# Patient Record
Sex: FEMALE | Race: WHITE | Employment: OTHER | URBAN - METROPOLITAN AREA
[De-identification: names, ages, dates, MRNs, and addresses within clinical notes are randomized per-mention and may not be internally consistent; named-entity substitution may affect disease eponyms.]

---

## 2017-01-01 ENCOUNTER — HOSPITAL ENCOUNTER (OUTPATIENT)
Dept: LAB | Age: 82
Discharge: HOME | End: 2017-08-02
Payer: MEDICARE

## 2017-01-01 ENCOUNTER — HOSPITAL ENCOUNTER (OUTPATIENT)
Dept: LAB | Age: 82
Discharge: HOME | End: 2017-03-08
Payer: MEDICARE

## 2017-01-01 ENCOUNTER — HOSPITAL ENCOUNTER (OUTPATIENT)
Dept: GENERAL RADIOLOGY | Age: 82
Discharge: HOME | End: 2017-08-02
Payer: MEDICARE

## 2017-01-01 DIAGNOSIS — J44.9 OBSTRUCTIVE CHRONIC BRONCHITIS WITHOUT EXACERBATION (HCC): ICD-10-CM

## 2017-01-01 LAB
ALBUMIN SERPL BCP-MCNC: 3.3 G/DL (ref 3.4–5)
ALBUMIN SERPL BCP-MCNC: 3.8 G/DL (ref 3.4–5)
ALBUMIN/GLOB SERPL: 0.8 {RATIO} (ref 0.8–1.7)
ALBUMIN/GLOB SERPL: 0.8 {RATIO} (ref 0.8–1.7)
ALP SERPL-CCNC: 106 U/L (ref 45–117)
ALP SERPL-CCNC: 95 U/L (ref 45–117)
ALT SERPL-CCNC: 12 U/L (ref 13–56)
ALT SERPL-CCNC: 16 U/L (ref 13–56)
ANION GAP BLD CALC-SCNC: 11 MMOL/L (ref 3–18)
ANION GAP BLD CALC-SCNC: 11 MMOL/L (ref 3–18)
APPEARANCE UR: CLEAR
AST SERPL W P-5'-P-CCNC: 18 U/L (ref 15–37)
AST SERPL W P-5'-P-CCNC: 19 U/L (ref 15–37)
BACTERIA URNS QL MICRO: ABNORMAL /HPF
BASOPHILS # BLD AUTO: 0 K/UL (ref 0–0.06)
BASOPHILS # BLD: 0 % (ref 0–2)
BILIRUB SERPL-MCNC: 0.7 MG/DL (ref 0.2–1)
BILIRUB SERPL-MCNC: 0.9 MG/DL (ref 0.2–1)
BILIRUB UR QL: NEGATIVE
BUN SERPL-MCNC: 15 MG/DL (ref 7–18)
BUN SERPL-MCNC: 8 MG/DL (ref 7–18)
BUN/CREAT SERPL: 11 (ref 12–20)
BUN/CREAT SERPL: 20 (ref 12–20)
CALCIUM SERPL-MCNC: 9.1 MG/DL (ref 8.5–10.1)
CALCIUM SERPL-MCNC: 9.3 MG/DL (ref 8.5–10.1)
CHLORIDE SERPL-SCNC: 102 MMOL/L (ref 100–108)
CHLORIDE SERPL-SCNC: 102 MMOL/L (ref 100–108)
CO2 SERPL-SCNC: 31 MMOL/L (ref 21–32)
CO2 SERPL-SCNC: 31 MMOL/L (ref 21–32)
COLOR UR: YELLOW
CREAT SERPL-MCNC: 0.74 MG/DL (ref 0.6–1.3)
CREAT SERPL-MCNC: 0.76 MG/DL (ref 0.6–1.3)
DIFFERENTIAL METHOD BLD: ABNORMAL
EOSINOPHIL # BLD: 0 K/UL (ref 0–0.4)
EOSINOPHIL NFR BLD: 0 % (ref 0–5)
EPITH CASTS URNS QL MICRO: ABNORMAL /LPF (ref 0–5)
ERYTHROCYTE [DISTWIDTH] IN BLOOD BY AUTOMATED COUNT: 13.7 % (ref 11.6–14.5)
ERYTHROCYTE [DISTWIDTH] IN BLOOD BY AUTOMATED COUNT: 14.6 % (ref 11.6–14.5)
FAX TO INFO,FAXT: NORMAL
FAX TO INFO,FAXT: NORMAL
FAX TO NUMBER,FAXN: NORMAL
FAX TO NUMBER,FAXN: NORMAL
GLOBULIN SER CALC-MCNC: 3.9 G/DL (ref 2–4)
GLOBULIN SER CALC-MCNC: 4.6 G/DL (ref 2–4)
GLUCOSE SERPL-MCNC: 113 MG/DL (ref 74–99)
GLUCOSE SERPL-MCNC: 97 MG/DL (ref 74–99)
GLUCOSE UR STRIP.AUTO-MCNC: NEGATIVE MG/DL
HCT VFR BLD AUTO: 38.5 % (ref 35–45)
HCT VFR BLD AUTO: 40.4 % (ref 35–45)
HGB BLD-MCNC: 13 G/DL (ref 12–16)
HGB BLD-MCNC: 13.1 G/DL (ref 12–16)
HGB UR QL STRIP: ABNORMAL
KETONES UR QL STRIP.AUTO: NEGATIVE MG/DL
LEUKOCYTE ESTERASE UR QL STRIP.AUTO: NEGATIVE
LYMPHOCYTES # BLD AUTO: 20 % (ref 21–52)
LYMPHOCYTES # BLD: 1.6 K/UL (ref 0.9–3.6)
MCH RBC QN AUTO: 29.1 PG (ref 24–34)
MCH RBC QN AUTO: 30.8 PG (ref 24–34)
MCHC RBC AUTO-ENTMCNC: 32.2 G/DL (ref 31–37)
MCHC RBC AUTO-ENTMCNC: 34 G/DL (ref 31–37)
MCV RBC AUTO: 90.4 FL (ref 74–97)
MCV RBC AUTO: 90.4 FL (ref 74–97)
MONOCYTES # BLD: 0.6 K/UL (ref 0.05–1.2)
MONOCYTES NFR BLD AUTO: 8 % (ref 3–10)
NEUTS SEG # BLD: 5.8 K/UL (ref 1.8–8)
NEUTS SEG NFR BLD AUTO: 72 % (ref 40–73)
NITRITE UR QL STRIP.AUTO: NEGATIVE
PH UR STRIP: 8 [PH] (ref 5–8)
PLATELET # BLD AUTO: 187 K/UL (ref 135–420)
PLATELET # BLD AUTO: 241 K/UL (ref 135–420)
PMV BLD AUTO: 10 FL (ref 9.2–11.8)
PMV BLD AUTO: 10.5 FL (ref 9.2–11.8)
POTASSIUM SERPL-SCNC: 3.6 MMOL/L (ref 3.5–5.5)
POTASSIUM SERPL-SCNC: 3.6 MMOL/L (ref 3.5–5.5)
PROT SERPL-MCNC: 7.2 G/DL (ref 6.4–8.2)
PROT SERPL-MCNC: 8.4 G/DL (ref 6.4–8.2)
PROT UR STRIP-MCNC: 30 MG/DL
RBC # BLD AUTO: 4.26 M/UL (ref 4.2–5.3)
RBC # BLD AUTO: 4.47 M/UL (ref 4.2–5.3)
RBC #/AREA URNS HPF: ABNORMAL /HPF (ref 0–5)
SODIUM SERPL-SCNC: 144 MMOL/L (ref 136–145)
SODIUM SERPL-SCNC: 144 MMOL/L (ref 136–145)
SP GR UR REFRACTOMETRY: 1.01 (ref 1–1.03)
T3FREE SERPL-MCNC: 2.8 PG/ML (ref 2.3–4.2)
T3FREE SERPL-MCNC: 3.4 PG/ML (ref 2.3–4.2)
TSH SERPL DL<=0.05 MIU/L-ACNC: 3.49 UIU/ML (ref 0.36–3.74)
TSH SERPL DL<=0.05 MIU/L-ACNC: <0.01 UIU/ML (ref 0.36–3.74)
UROBILINOGEN UR QL STRIP.AUTO: 0.2 EU/DL (ref 0.2–1)
WBC # BLD AUTO: 6.7 K/UL (ref 4.6–13.2)
WBC # BLD AUTO: 8 K/UL (ref 4.6–13.2)
WBC URNS QL MICRO: ABNORMAL /HPF (ref 0–5)

## 2017-01-01 PROCEDURE — 84481 FREE ASSAY (FT-3): CPT | Performed by: FAMILY MEDICINE

## 2017-01-01 PROCEDURE — 36415 COLL VENOUS BLD VENIPUNCTURE: CPT | Performed by: FAMILY MEDICINE

## 2017-01-01 PROCEDURE — 71020 XR CHEST AP LAT: CPT

## 2017-01-01 PROCEDURE — 85025 COMPLETE CBC W/AUTO DIFF WBC: CPT | Performed by: FAMILY MEDICINE

## 2017-01-01 PROCEDURE — 80053 COMPREHEN METABOLIC PANEL: CPT | Performed by: FAMILY MEDICINE

## 2017-01-01 PROCEDURE — 84443 ASSAY THYROID STIM HORMONE: CPT | Performed by: FAMILY MEDICINE

## 2017-01-01 PROCEDURE — 81001 URINALYSIS AUTO W/SCOPE: CPT | Performed by: FAMILY MEDICINE

## 2017-01-01 PROCEDURE — 85027 COMPLETE CBC AUTOMATED: CPT | Performed by: FAMILY MEDICINE

## 2018-01-01 ENCOUNTER — APPOINTMENT (OUTPATIENT)
Dept: CT IMAGING | Age: 83
DRG: 193 | End: 2018-01-01
Attending: INTERNAL MEDICINE
Payer: MEDICARE

## 2018-01-01 ENCOUNTER — APPOINTMENT (OUTPATIENT)
Dept: GENERAL RADIOLOGY | Age: 83
DRG: 193 | End: 2018-01-01
Attending: INTERNAL MEDICINE
Payer: MEDICARE

## 2018-01-01 ENCOUNTER — HOSPITAL ENCOUNTER (INPATIENT)
Age: 83
LOS: 3 days | Discharge: HOSPICE | DRG: 193 | End: 2018-01-18
Attending: INTERNAL MEDICINE | Admitting: INTERNAL MEDICINE
Payer: MEDICARE

## 2018-01-01 VITALS
OXYGEN SATURATION: 98 % | TEMPERATURE: 98.6 F | DIASTOLIC BLOOD PRESSURE: 79 MMHG | SYSTOLIC BLOOD PRESSURE: 149 MMHG | BODY MASS INDEX: 19.99 KG/M2 | HEIGHT: 65 IN | HEART RATE: 85 BPM | WEIGHT: 120 LBS | RESPIRATION RATE: 18 BRPM

## 2018-01-01 DIAGNOSIS — I72.9 ANEURYSM (HCC): ICD-10-CM

## 2018-01-01 DIAGNOSIS — R91.8 RIGHT LOWER LOBE LUNG MASS: ICD-10-CM

## 2018-01-01 DIAGNOSIS — J18.9 COMMUNITY ACQUIRED PNEUMONIA OF LEFT LOWER LOBE OF LUNG: ICD-10-CM

## 2018-01-01 DIAGNOSIS — J43.9 PULMONARY EMPHYSEMA, UNSPECIFIED EMPHYSEMA TYPE (HCC): ICD-10-CM

## 2018-01-01 DIAGNOSIS — J43.1 PANLOBULAR EMPHYSEMA (HCC): ICD-10-CM

## 2018-01-01 DIAGNOSIS — I24.9 ACS (ACUTE CORONARY SYNDROME) (HCC): ICD-10-CM

## 2018-01-01 DIAGNOSIS — R06.89 RESPIRATORY INSUFFICIENCY: Primary | ICD-10-CM

## 2018-01-01 DIAGNOSIS — J44.1 ACUTE EXACERBATION OF CHRONIC OBSTRUCTIVE PULMONARY DISEASE (COPD) (HCC): ICD-10-CM

## 2018-01-01 DIAGNOSIS — J81.0 ACUTE PULMONARY EDEMA (HCC): ICD-10-CM

## 2018-01-01 LAB
ALBUMIN SERPL-MCNC: 2.7 G/DL (ref 3.4–5)
ALBUMIN SERPL-MCNC: 3.1 G/DL (ref 3.4–5)
ALBUMIN/GLOB SERPL: 0.7 {RATIO} (ref 0.8–1.7)
ALBUMIN/GLOB SERPL: 0.7 {RATIO} (ref 0.8–1.7)
ALP SERPL-CCNC: 80 U/L (ref 45–117)
ALP SERPL-CCNC: 89 U/L (ref 45–117)
ALT SERPL-CCNC: 56 U/L (ref 13–56)
ALT SERPL-CCNC: 65 U/L (ref 13–56)
ANION GAP BLD CALC-SCNC: 19 MMOL/L (ref 10–20)
ANION GAP SERPL CALC-SCNC: 11 MMOL/L (ref 3–18)
ANION GAP SERPL CALC-SCNC: 2 MMOL/L (ref 3–18)
ANION GAP SERPL CALC-SCNC: 7 MMOL/L (ref 3–18)
APTT PPP: 36.2 SEC (ref 23–36.4)
ARTERIAL PATENCY WRIST A: YES
ARTERIAL PATENCY WRIST A: YES
AST SERPL-CCNC: 59 U/L (ref 15–37)
AST SERPL-CCNC: 73 U/L (ref 15–37)
ATRIAL RATE: 117 BPM
ATRIAL RATE: 182 BPM
BACTERIA SPEC CULT: NORMAL
BACTERIA SPEC CULT: NORMAL
BASE EXCESS BLD CALC-SCNC: 7 MMOL/L
BASE EXCESS BLD CALC-SCNC: 8 MMOL/L
BASOPHILS # BLD: 0.1 K/UL (ref 0–0.06)
BASOPHILS NFR BLD: 0 % (ref 0–2)
BDY SITE: ABNORMAL
BDY SITE: ABNORMAL
BILIRUB SERPL-MCNC: 0.4 MG/DL (ref 0.2–1)
BILIRUB SERPL-MCNC: 1 MG/DL (ref 0.2–1)
BNP SERPL-MCNC: ABNORMAL PG/ML (ref 0–1800)
BUN BLD-MCNC: 27 MG/DL (ref 7–18)
BUN SERPL-MCNC: 27 MG/DL (ref 7–18)
BUN SERPL-MCNC: 32 MG/DL (ref 7–18)
BUN SERPL-MCNC: 34 MG/DL (ref 7–18)
BUN/CREAT SERPL: 22 (ref 12–20)
BUN/CREAT SERPL: 30 (ref 12–20)
BUN/CREAT SERPL: 37 (ref 12–20)
CA-I BLD-MCNC: 1.07 MMOL/L (ref 1.12–1.32)
CALCIUM SERPL-MCNC: 8.7 MG/DL (ref 8.5–10.1)
CALCIUM SERPL-MCNC: 8.7 MG/DL (ref 8.5–10.1)
CALCIUM SERPL-MCNC: 8.9 MG/DL (ref 8.5–10.1)
CALCULATED P AXIS, ECG09: 87 DEGREES
CALCULATED R AXIS, ECG10: -78 DEGREES
CALCULATED R AXIS, ECG10: -90 DEGREES
CALCULATED T AXIS, ECG11: 61 DEGREES
CALCULATED T AXIS, ECG11: 89 DEGREES
CHLORIDE BLD-SCNC: 96 MMOL/L (ref 100–108)
CHLORIDE SERPL-SCNC: 101 MMOL/L (ref 100–108)
CHLORIDE SERPL-SCNC: 104 MMOL/L (ref 100–108)
CHLORIDE SERPL-SCNC: 98 MMOL/L (ref 100–108)
CK MB CFR SERPL CALC: 1.9 % (ref 0–4)
CK MB CFR SERPL CALC: 4 % (ref 0–4)
CK MB CFR SERPL CALC: ABNORMAL % (ref 0–4)
CK MB SERPL-MCNC: 2.2 NG/ML (ref 5–25)
CK MB SERPL-MCNC: 4.6 NG/ML (ref 5–25)
CK MB SERPL-MCNC: <1 NG/ML (ref 5–25)
CK SERPL-CCNC: 114 U/L (ref 26–192)
CK SERPL-CCNC: 115 U/L (ref 26–192)
CK SERPL-CCNC: 120 U/L (ref 26–192)
CO2 BLD-SCNC: 31 MMOL/L (ref 19–24)
CO2 SERPL-SCNC: 32 MMOL/L (ref 21–32)
CO2 SERPL-SCNC: 36 MMOL/L (ref 21–32)
CO2 SERPL-SCNC: 36 MMOL/L (ref 21–32)
CREAT SERPL-MCNC: 0.72 MG/DL (ref 0.6–1.3)
CREAT SERPL-MCNC: 0.93 MG/DL (ref 0.6–1.3)
CREAT SERPL-MCNC: 1.07 MG/DL (ref 0.6–1.3)
CREAT SERPL-MCNC: 1.24 MG/DL (ref 0.6–1.3)
CREAT UR-MCNC: 1.1 MG/DL (ref 0.6–1.3)
D DIMER PPP FEU-MCNC: 12.29 UG/ML(FEU)
DIAGNOSIS, 93000: NORMAL
DIAGNOSIS, 93000: NORMAL
DIFFERENTIAL METHOD BLD: ABNORMAL
EOSINOPHIL # BLD: 0 K/UL (ref 0–0.4)
EOSINOPHIL NFR BLD: 0 % (ref 0–5)
ERYTHROCYTE [DISTWIDTH] IN BLOOD BY AUTOMATED COUNT: 14.6 % (ref 11.6–14.5)
ERYTHROCYTE [DISTWIDTH] IN BLOOD BY AUTOMATED COUNT: 14.9 % (ref 11.6–14.5)
EST. AVERAGE GLUCOSE BLD GHB EST-MCNC: 97 MG/DL
FLUAV AG NPH QL IA: POSITIVE
FLUBV AG NOSE QL IA: NEGATIVE
GAS FLOW.O2 O2 DELIVERY SYS: ABNORMAL L/MIN
GAS FLOW.O2 O2 DELIVERY SYS: ABNORMAL L/MIN
GAS FLOW.O2 SETTING OXYMISER: 15 L/M
GAS FLOW.O2 SETTING OXYMISER: 3 L/M
GLOBULIN SER CALC-MCNC: 4.1 G/DL (ref 2–4)
GLOBULIN SER CALC-MCNC: 4.4 G/DL (ref 2–4)
GLUCOSE BLD STRIP.AUTO-MCNC: 103 MG/DL (ref 70–110)
GLUCOSE BLD STRIP.AUTO-MCNC: 123 MG/DL (ref 70–110)
GLUCOSE BLD STRIP.AUTO-MCNC: 123 MG/DL (ref 74–106)
GLUCOSE BLD STRIP.AUTO-MCNC: 126 MG/DL (ref 70–110)
GLUCOSE BLD STRIP.AUTO-MCNC: 131 MG/DL (ref 70–110)
GLUCOSE BLD STRIP.AUTO-MCNC: 133 MG/DL (ref 70–110)
GLUCOSE BLD STRIP.AUTO-MCNC: 133 MG/DL (ref 70–110)
GLUCOSE BLD STRIP.AUTO-MCNC: 138 MG/DL (ref 70–110)
GLUCOSE BLD STRIP.AUTO-MCNC: 157 MG/DL (ref 70–110)
GLUCOSE BLD STRIP.AUTO-MCNC: 163 MG/DL (ref 70–110)
GLUCOSE BLD STRIP.AUTO-MCNC: 165 MG/DL (ref 70–110)
GLUCOSE BLD STRIP.AUTO-MCNC: 177 MG/DL (ref 70–110)
GLUCOSE BLD STRIP.AUTO-MCNC: 186 MG/DL (ref 70–110)
GLUCOSE SERPL-MCNC: 128 MG/DL (ref 74–99)
GLUCOSE SERPL-MCNC: 135 MG/DL (ref 74–99)
GLUCOSE SERPL-MCNC: 147 MG/DL (ref 74–99)
HBA1C MFR BLD: 5 % (ref 4.5–5.6)
HCO3 BLD-SCNC: 32.1 MMOL/L (ref 22–26)
HCO3 BLD-SCNC: 33.1 MMOL/L (ref 22–26)
HCT VFR BLD AUTO: 35.8 % (ref 35–45)
HCT VFR BLD AUTO: 37.4 % (ref 35–45)
HCT VFR BLD CALC: 58 % (ref 36–49)
HGB BLD-MCNC: 11.1 G/DL (ref 12–16)
HGB BLD-MCNC: 12 G/DL (ref 12–16)
HGB BLD-MCNC: 19.7 G/DL (ref 12–16)
INR PPP: 1.2 (ref 0.8–1.2)
LACTATE SERPL-SCNC: 2.2 MMOL/L (ref 0.4–2)
LYMPHOCYTES # BLD: 1.3 K/UL (ref 0.9–3.6)
LYMPHOCYTES NFR BLD: 9 % (ref 21–52)
MAGNESIUM SERPL-MCNC: 1.7 MG/DL (ref 1.6–2.6)
MAGNESIUM SERPL-MCNC: 2.1 MG/DL (ref 1.6–2.6)
MAGNESIUM SERPL-MCNC: 2.3 MG/DL (ref 1.6–2.6)
MCH RBC QN AUTO: 28.5 PG (ref 24–34)
MCH RBC QN AUTO: 29.6 PG (ref 24–34)
MCHC RBC AUTO-ENTMCNC: 31 G/DL (ref 31–37)
MCHC RBC AUTO-ENTMCNC: 32.1 G/DL (ref 31–37)
MCV RBC AUTO: 92 FL (ref 74–97)
MCV RBC AUTO: 92.1 FL (ref 74–97)
MONOCYTES # BLD: 1.3 K/UL (ref 0.05–1.2)
MONOCYTES NFR BLD: 9 % (ref 3–10)
NEUTS SEG # BLD: 12.2 K/UL (ref 1.8–8)
NEUTS SEG NFR BLD: 82 % (ref 40–73)
O2/TOTAL GAS SETTING VFR VENT: 90 %
P-R INTERVAL, ECG05: 166 MS
PCO2 BLD: 54.4 MMHG (ref 35–45)
PCO2 BLD: 55.1 MMHG (ref 35–45)
PH BLD: 7.37 [PH] (ref 7.35–7.45)
PH BLD: 7.39 [PH] (ref 7.35–7.45)
PLATELET # BLD AUTO: 149 K/UL (ref 135–420)
PLATELET # BLD AUTO: 161 K/UL (ref 135–420)
PLATELET # BLD AUTO: 233 K/UL (ref 135–420)
PMV BLD AUTO: 10.3 FL (ref 9.2–11.8)
PMV BLD AUTO: 10.6 FL (ref 9.2–11.8)
PO2 BLD: 118 MMHG (ref 80–100)
PO2 BLD: 53 MMHG (ref 80–100)
POTASSIUM BLD-SCNC: 3.6 MMOL/L (ref 3.5–5.5)
POTASSIUM SERPL-SCNC: 3.5 MMOL/L (ref 3.5–5.5)
POTASSIUM SERPL-SCNC: 3.6 MMOL/L (ref 3.5–5.5)
POTASSIUM SERPL-SCNC: 4 MMOL/L (ref 3.5–5.5)
PROT SERPL-MCNC: 6.8 G/DL (ref 6.4–8.2)
PROT SERPL-MCNC: 7.5 G/DL (ref 6.4–8.2)
PROTHROMBIN TIME: 14.9 SEC (ref 11.5–15.2)
Q-T INTERVAL, ECG07: 274 MS
Q-T INTERVAL, ECG07: 358 MS
QRS DURATION, ECG06: 114 MS
QRS DURATION, ECG06: 122 MS
QTC CALCULATION (BEZET), ECG08: 476 MS
QTC CALCULATION (BEZET), ECG08: 499 MS
RBC # BLD AUTO: 3.89 M/UL (ref 4.2–5.3)
RBC # BLD AUTO: 4.06 M/UL (ref 4.2–5.3)
SAO2 % BLD: 86 % (ref 92–97)
SAO2 % BLD: 98 % (ref 92–97)
SERVICE CMNT-IMP: ABNORMAL
SERVICE CMNT-IMP: ABNORMAL
SERVICE CMNT-IMP: NORMAL
SERVICE CMNT-IMP: NORMAL
SODIUM BLD-SCNC: 141 MMOL/L (ref 136–145)
SODIUM SERPL-SCNC: 141 MMOL/L (ref 136–145)
SODIUM SERPL-SCNC: 142 MMOL/L (ref 136–145)
SODIUM SERPL-SCNC: 144 MMOL/L (ref 136–145)
SPECIMEN TYPE: ABNORMAL
SPECIMEN TYPE: ABNORMAL
T4 FREE SERPL-MCNC: 1.6 NG/DL (ref 0.7–1.5)
TOTAL RESP. RATE, ITRR: 25
TROPONIN I BLD-MCNC: 0.24 NG/ML (ref 0–0.08)
TROPONIN I SERPL-MCNC: 0.09 NG/ML (ref 0–0.06)
TROPONIN I SERPL-MCNC: 0.45 NG/ML (ref 0–0.06)
TROPONIN I SERPL-MCNC: 0.9 NG/ML (ref 0–0.06)
TSH SERPL DL<=0.05 MIU/L-ACNC: 0.1 UIU/ML (ref 0.36–3.74)
VENTRICULAR RATE, ECG03: 117 BPM
VENTRICULAR RATE, ECG03: 182 BPM
WBC # BLD AUTO: 14.9 K/UL (ref 4.6–13.2)
WBC # BLD AUTO: 9.6 K/UL (ref 4.6–13.2)

## 2018-01-01 PROCEDURE — 96361 HYDRATE IV INFUSION ADD-ON: CPT

## 2018-01-01 PROCEDURE — 94660 CPAP INITIATION&MGMT: CPT

## 2018-01-01 PROCEDURE — 93005 ELECTROCARDIOGRAM TRACING: CPT

## 2018-01-01 PROCEDURE — 74011250636 HC RX REV CODE- 250/636: Performed by: INTERNAL MEDICINE

## 2018-01-01 PROCEDURE — 82803 BLOOD GASES ANY COMBINATION: CPT

## 2018-01-01 PROCEDURE — 74011250637 HC RX REV CODE- 250/637: Performed by: INTERNAL MEDICINE

## 2018-01-01 PROCEDURE — 85025 COMPLETE CBC W/AUTO DIFF WBC: CPT | Performed by: INTERNAL MEDICINE

## 2018-01-01 PROCEDURE — 87804 INFLUENZA ASSAY W/OPTIC: CPT | Performed by: INTERNAL MEDICINE

## 2018-01-01 PROCEDURE — 99285 EMERGENCY DEPT VISIT HI MDM: CPT

## 2018-01-01 PROCEDURE — 77010033678 HC OXYGEN DAILY

## 2018-01-01 PROCEDURE — 82550 ASSAY OF CK (CPK): CPT | Performed by: INTERNAL MEDICINE

## 2018-01-01 PROCEDURE — 85049 AUTOMATED PLATELET COUNT: CPT | Performed by: INTERNAL MEDICINE

## 2018-01-01 PROCEDURE — 36415 COLL VENOUS BLD VENIPUNCTURE: CPT | Performed by: INTERNAL MEDICINE

## 2018-01-01 PROCEDURE — 83735 ASSAY OF MAGNESIUM: CPT | Performed by: INTERNAL MEDICINE

## 2018-01-01 PROCEDURE — 74011000250 HC RX REV CODE- 250: Performed by: INTERNAL MEDICINE

## 2018-01-01 PROCEDURE — 83880 ASSAY OF NATRIURETIC PEPTIDE: CPT | Performed by: INTERNAL MEDICINE

## 2018-01-01 PROCEDURE — 71045 X-RAY EXAM CHEST 1 VIEW: CPT

## 2018-01-01 PROCEDURE — 94640 AIRWAY INHALATION TREATMENT: CPT

## 2018-01-01 PROCEDURE — 77030035694 HC MSK BIPAP FLL FAC PERFMAX PHIL -B

## 2018-01-01 PROCEDURE — 96374 THER/PROPH/DIAG INJ IV PUSH: CPT

## 2018-01-01 PROCEDURE — 85379 FIBRIN DEGRADATION QUANT: CPT | Performed by: INTERNAL MEDICINE

## 2018-01-01 PROCEDURE — 74011636320 HC RX REV CODE- 636/320: Performed by: INTERNAL MEDICINE

## 2018-01-01 PROCEDURE — 82962 GLUCOSE BLOOD TEST: CPT

## 2018-01-01 PROCEDURE — 83036 HEMOGLOBIN GLYCOSYLATED A1C: CPT | Performed by: INTERNAL MEDICINE

## 2018-01-01 PROCEDURE — 74011000258 HC RX REV CODE- 258: Performed by: INTERNAL MEDICINE

## 2018-01-01 PROCEDURE — 74011250637 HC RX REV CODE- 250/637: Performed by: FAMILY MEDICINE

## 2018-01-01 PROCEDURE — 65610000006 HC RM INTENSIVE CARE

## 2018-01-01 PROCEDURE — 87040 BLOOD CULTURE FOR BACTERIA: CPT | Performed by: INTERNAL MEDICINE

## 2018-01-01 PROCEDURE — 76450000000

## 2018-01-01 PROCEDURE — 74011636637 HC RX REV CODE- 636/637: Performed by: INTERNAL MEDICINE

## 2018-01-01 PROCEDURE — 36600 WITHDRAWAL OF ARTERIAL BLOOD: CPT

## 2018-01-01 PROCEDURE — 83605 ASSAY OF LACTIC ACID: CPT | Performed by: INTERNAL MEDICINE

## 2018-01-01 PROCEDURE — 93306 TTE W/DOPPLER COMPLETE: CPT

## 2018-01-01 PROCEDURE — 80047 BASIC METABLC PNL IONIZED CA: CPT

## 2018-01-01 PROCEDURE — 85730 THROMBOPLASTIN TIME PARTIAL: CPT | Performed by: INTERNAL MEDICINE

## 2018-01-01 PROCEDURE — 77030013140 HC MSK NEB VYRM -A

## 2018-01-01 PROCEDURE — 94760 N-INVAS EAR/PLS OXIMETRY 1: CPT

## 2018-01-01 PROCEDURE — 77030027138 HC INCENT SPIROMETER -A

## 2018-01-01 PROCEDURE — 77030011256 HC DRSG MEPILEX <16IN NO BORD MOLN -A

## 2018-01-01 PROCEDURE — 84439 ASSAY OF FREE THYROXINE: CPT | Performed by: INTERNAL MEDICINE

## 2018-01-01 PROCEDURE — 65270000029 HC RM PRIVATE

## 2018-01-01 PROCEDURE — 85610 PROTHROMBIN TIME: CPT | Performed by: INTERNAL MEDICINE

## 2018-01-01 PROCEDURE — 71275 CT ANGIOGRAPHY CHEST: CPT

## 2018-01-01 PROCEDURE — 80053 COMPREHEN METABOLIC PANEL: CPT | Performed by: INTERNAL MEDICINE

## 2018-01-01 PROCEDURE — 85027 COMPLETE CBC AUTOMATED: CPT | Performed by: INTERNAL MEDICINE

## 2018-01-01 PROCEDURE — 96375 TX/PRO/DX INJ NEW DRUG ADDON: CPT

## 2018-01-01 PROCEDURE — 82565 ASSAY OF CREATININE: CPT | Performed by: INTERNAL MEDICINE

## 2018-01-01 PROCEDURE — 80048 BASIC METABOLIC PNL TOTAL CA: CPT | Performed by: INTERNAL MEDICINE

## 2018-01-01 PROCEDURE — 84443 ASSAY THYROID STIM HORMONE: CPT | Performed by: INTERNAL MEDICINE

## 2018-01-01 PROCEDURE — 84484 ASSAY OF TROPONIN QUANT: CPT | Performed by: INTERNAL MEDICINE

## 2018-01-01 RX ORDER — METHIMAZOLE 5 MG/1
2.5 TABLET ORAL DAILY
Status: DISCONTINUED | OUTPATIENT
Start: 2018-01-01 | End: 2018-01-01 | Stop reason: HOSPADM

## 2018-01-01 RX ORDER — GUAIFENESIN 600 MG/1
600 TABLET, EXTENDED RELEASE ORAL EVERY 12 HOURS
Status: DISCONTINUED | OUTPATIENT
Start: 2018-01-01 | End: 2018-01-01 | Stop reason: HOSPADM

## 2018-01-01 RX ORDER — FUROSEMIDE 20 MG/1
20 TABLET ORAL DAILY
Qty: 30 TAB | Refills: 0 | Status: SHIPPED | OUTPATIENT
Start: 2018-01-01

## 2018-01-01 RX ORDER — OSELTAMIVIR PHOSPHATE 75 MG/1
75 CAPSULE ORAL 2 TIMES DAILY
Status: DISCONTINUED | OUTPATIENT
Start: 2018-01-01 | End: 2018-01-01 | Stop reason: DRUGHIGH

## 2018-01-01 RX ORDER — PREDNISONE 20 MG/1
TABLET ORAL
Qty: 10 TAB | Refills: 0 | Status: SHIPPED | OUTPATIENT
Start: 2018-01-01

## 2018-01-01 RX ORDER — FUROSEMIDE 10 MG/ML
40 INJECTION INTRAMUSCULAR; INTRAVENOUS 2 TIMES DAILY
Status: DISCONTINUED | OUTPATIENT
Start: 2018-01-01 | End: 2018-01-01

## 2018-01-01 RX ORDER — MAGNESIUM SULFATE 100 %
4 CRYSTALS MISCELLANEOUS AS NEEDED
Status: DISCONTINUED | OUTPATIENT
Start: 2018-01-01 | End: 2018-01-01 | Stop reason: HOSPADM

## 2018-01-01 RX ORDER — BUDESONIDE 0.5 MG/2ML
500 INHALANT ORAL
Status: DISCONTINUED | OUTPATIENT
Start: 2018-01-01 | End: 2018-01-01 | Stop reason: HOSPADM

## 2018-01-01 RX ORDER — FUROSEMIDE 10 MG/ML
20 INJECTION INTRAMUSCULAR; INTRAVENOUS
Status: COMPLETED | OUTPATIENT
Start: 2018-01-01 | End: 2018-01-01

## 2018-01-01 RX ORDER — MEPERIDINE HYDROCHLORIDE 50 MG/1
50 TABLET ORAL
COMMUNITY
End: 2018-01-01

## 2018-01-01 RX ORDER — AMOXICILLIN AND CLAVULANATE POTASSIUM 875; 125 MG/1; MG/1
1 TABLET, FILM COATED ORAL 2 TIMES DAILY
Qty: 14 TAB | Refills: 0 | Status: SHIPPED | OUTPATIENT
Start: 2018-01-01

## 2018-01-01 RX ORDER — AMIODARONE HYDROCHLORIDE 150 MG/3ML
150 INJECTION, SOLUTION INTRAVENOUS
Status: DISCONTINUED | OUTPATIENT
Start: 2018-01-01 | End: 2018-01-01

## 2018-01-01 RX ORDER — TEMAZEPAM 15 MG/1
30 CAPSULE ORAL
Status: DISCONTINUED | OUTPATIENT
Start: 2018-01-01 | End: 2018-01-01 | Stop reason: HOSPADM

## 2018-01-01 RX ORDER — PANTOPRAZOLE SODIUM 40 MG/1
40 TABLET, DELAYED RELEASE ORAL
Status: DISCONTINUED | OUTPATIENT
Start: 2018-01-01 | End: 2018-01-01

## 2018-01-01 RX ORDER — POTASSIUM CHLORIDE 20 MEQ/1
40 TABLET, EXTENDED RELEASE ORAL
Status: DISCONTINUED | OUTPATIENT
Start: 2018-01-01 | End: 2018-01-01

## 2018-01-01 RX ORDER — INSULIN LISPRO 100 [IU]/ML
INJECTION, SOLUTION INTRAVENOUS; SUBCUTANEOUS
Status: DISCONTINUED | OUTPATIENT
Start: 2018-01-01 | End: 2018-01-01 | Stop reason: HOSPADM

## 2018-01-01 RX ORDER — PROMETHAZINE HYDROCHLORIDE 25 MG/1
25 TABLET ORAL AS NEEDED
COMMUNITY
End: 2018-01-01

## 2018-01-01 RX ORDER — IPRATROPIUM BROMIDE 0.5 MG/2.5ML
0.5 SOLUTION RESPIRATORY (INHALATION)
Status: DISCONTINUED | OUTPATIENT
Start: 2018-01-01 | End: 2018-01-01 | Stop reason: HOSPADM

## 2018-01-01 RX ORDER — ONDANSETRON 4 MG/1
4 TABLET, FILM COATED ORAL
Status: DISCONTINUED | OUTPATIENT
Start: 2018-01-01 | End: 2018-01-01 | Stop reason: HOSPADM

## 2018-01-01 RX ORDER — AZITHROMYCIN 200 MG/5ML
500 POWDER, FOR SUSPENSION ORAL ONCE
Status: DISCONTINUED | OUTPATIENT
Start: 2018-01-01 | End: 2018-01-01

## 2018-01-01 RX ORDER — ADENOSINE 3 MG/ML
6 INJECTION, SOLUTION INTRAVENOUS
Status: DISCONTINUED | OUTPATIENT
Start: 2018-01-01 | End: 2018-01-01

## 2018-01-01 RX ORDER — AMOXICILLIN AND CLAVULANATE POTASSIUM 875; 125 MG/1; MG/1
1 TABLET, FILM COATED ORAL EVERY 12 HOURS
Status: DISCONTINUED | OUTPATIENT
Start: 2018-01-01 | End: 2018-01-01 | Stop reason: HOSPADM

## 2018-01-01 RX ORDER — AZITHROMYCIN 250 MG/1
500 TABLET, FILM COATED ORAL DAILY
Status: DISCONTINUED | OUTPATIENT
Start: 2018-01-01 | End: 2018-01-01 | Stop reason: HOSPADM

## 2018-01-01 RX ORDER — FUROSEMIDE 10 MG/ML
20 INJECTION INTRAMUSCULAR; INTRAVENOUS ONCE
Status: COMPLETED | OUTPATIENT
Start: 2018-01-01 | End: 2018-01-01

## 2018-01-01 RX ORDER — IPRATROPIUM BROMIDE AND ALBUTEROL SULFATE 2.5; .5 MG/3ML; MG/3ML
3 SOLUTION RESPIRATORY (INHALATION)
Status: DISCONTINUED | OUTPATIENT
Start: 2018-01-01 | End: 2018-01-01

## 2018-01-01 RX ORDER — NYSTATIN 100000 [USP'U]/ML
500000 SUSPENSION ORAL 4 TIMES DAILY
Status: DISCONTINUED | OUTPATIENT
Start: 2018-01-01 | End: 2018-01-01 | Stop reason: HOSPADM

## 2018-01-01 RX ORDER — DEXTROSE 50 % IN WATER (D50W) INTRAVENOUS SYRINGE
25-50 AS NEEDED
Status: DISCONTINUED | OUTPATIENT
Start: 2018-01-01 | End: 2018-01-01 | Stop reason: HOSPADM

## 2018-01-01 RX ORDER — METHIMAZOLE 5 MG/1
2.5 TABLET ORAL
Status: ON HOLD | COMMUNITY
End: 2018-01-01 | Stop reason: DRUGHIGH

## 2018-01-01 RX ORDER — AMLODIPINE BESYLATE 5 MG/1
5 TABLET ORAL DAILY
Status: DISCONTINUED | OUTPATIENT
Start: 2018-01-01 | End: 2018-01-01

## 2018-01-01 RX ORDER — FUROSEMIDE 20 MG/1
20 TABLET ORAL DAILY
Status: DISCONTINUED | OUTPATIENT
Start: 2018-01-01 | End: 2018-01-01 | Stop reason: HOSPADM

## 2018-01-01 RX ORDER — DILTIAZEM HYDROCHLORIDE 120 MG/1
120 CAPSULE, COATED, EXTENDED RELEASE ORAL DAILY
Qty: 30 CAP | Refills: 0 | Status: SHIPPED | OUTPATIENT
Start: 2018-01-01

## 2018-01-01 RX ORDER — AZITHROMYCIN 500 MG/1
500 TABLET, FILM COATED ORAL DAILY
Qty: 5 TAB | Refills: 0 | Status: SHIPPED | OUTPATIENT
Start: 2018-01-01

## 2018-01-01 RX ORDER — PAROXETINE HYDROCHLORIDE 20 MG/1
20 TABLET, FILM COATED ORAL DAILY
Status: DISCONTINUED | OUTPATIENT
Start: 2018-01-01 | End: 2018-01-01 | Stop reason: HOSPADM

## 2018-01-01 RX ORDER — OSELTAMIVIR PHOSPHATE 30 MG/1
30 CAPSULE ORAL EVERY 12 HOURS
Status: DISCONTINUED | OUTPATIENT
Start: 2018-01-01 | End: 2018-01-01 | Stop reason: HOSPADM

## 2018-01-01 RX ORDER — ACETAMINOPHEN 325 MG/1
650 TABLET ORAL
Status: DISCONTINUED | OUTPATIENT
Start: 2018-01-01 | End: 2018-01-01 | Stop reason: HOSPADM

## 2018-01-01 RX ORDER — BUDESONIDE 0.5 MG/2ML
500 INHALANT ORAL 2 TIMES DAILY
Qty: 60 EACH | Refills: 0 | Status: SHIPPED | OUTPATIENT
Start: 2018-01-01

## 2018-01-01 RX ORDER — IPRATROPIUM BROMIDE 0.5 MG/2.5ML
0.5 SOLUTION RESPIRATORY (INHALATION)
Qty: 60 VIAL | Refills: 0 | Status: SHIPPED | OUTPATIENT
Start: 2018-01-01

## 2018-01-01 RX ORDER — TEMAZEPAM 30 MG/1
30 CAPSULE ORAL
COMMUNITY

## 2018-01-01 RX ORDER — DILTIAZEM HYDROCHLORIDE 30 MG/1
30 TABLET, FILM COATED ORAL
Status: DISCONTINUED | OUTPATIENT
Start: 2018-01-01 | End: 2018-01-01 | Stop reason: HOSPADM

## 2018-01-01 RX ORDER — OSELTAMIVIR PHOSPHATE 30 MG/1
30 CAPSULE ORAL EVERY 12 HOURS
Qty: 3 CAP | Refills: 0 | Status: SHIPPED | OUTPATIENT
Start: 2018-01-01

## 2018-01-01 RX ORDER — HEPARIN SODIUM 5000 [USP'U]/ML
5000 INJECTION, SOLUTION INTRAVENOUS; SUBCUTANEOUS EVERY 8 HOURS
Status: DISCONTINUED | OUTPATIENT
Start: 2018-01-01 | End: 2018-01-01

## 2018-01-01 RX ORDER — PREDNISONE 20 MG/1
40 TABLET ORAL
Status: DISCONTINUED | OUTPATIENT
Start: 2018-01-01 | End: 2018-01-01 | Stop reason: HOSPADM

## 2018-01-01 RX ORDER — GUAIFENESIN 100 MG/5ML
324 LIQUID (ML) ORAL
Status: COMPLETED | OUTPATIENT
Start: 2018-01-01 | End: 2018-01-01

## 2018-01-01 RX ORDER — MAGNESIUM SULFATE HEPTAHYDRATE 40 MG/ML
2 INJECTION, SOLUTION INTRAVENOUS
Status: ACTIVE | OUTPATIENT
Start: 2018-01-01 | End: 2018-01-01

## 2018-01-01 RX ORDER — OXYCODONE HCL 20 MG/ML
5 CONCENTRATE, ORAL ORAL
Status: DISCONTINUED | OUTPATIENT
Start: 2018-01-01 | End: 2018-01-01 | Stop reason: HOSPADM

## 2018-01-01 RX ORDER — ENOXAPARIN SODIUM 100 MG/ML
1 INJECTION SUBCUTANEOUS EVERY 12 HOURS
Status: DISCONTINUED | OUTPATIENT
Start: 2018-01-01 | End: 2018-01-01 | Stop reason: HOSPADM

## 2018-01-01 RX ORDER — POTASSIUM CHLORIDE 20MEQ/15ML
40 LIQUID (ML) ORAL
Status: COMPLETED | OUTPATIENT
Start: 2018-01-01 | End: 2018-01-01

## 2018-01-01 RX ORDER — NYSTATIN 100000 [USP'U]/ML
1 SUSPENSION ORAL 4 TIMES DAILY
Qty: 140 ML | Refills: 0 | Status: SHIPPED | OUTPATIENT
Start: 2018-01-01 | End: 2018-01-01

## 2018-01-01 RX ORDER — OXYCODONE HCL 20 MG/ML
5 CONCENTRATE, ORAL ORAL
Qty: 30 ML | Refills: 0 | Status: SHIPPED | OUTPATIENT
Start: 2018-01-01

## 2018-01-01 RX ADMIN — SODIUM CHLORIDE 2.25 G: 900 INJECTION, SOLUTION INTRAVENOUS at 21:15

## 2018-01-01 RX ADMIN — IPRATROPIUM BROMIDE 0.5 MG: 0.5 SOLUTION RESPIRATORY (INHALATION) at 20:09

## 2018-01-01 RX ADMIN — DILTIAZEM HYDROCHLORIDE 30 MG: 30 TABLET, FILM COATED ORAL at 08:34

## 2018-01-01 RX ADMIN — IOPAMIDOL 100 ML: 755 INJECTION, SOLUTION INTRAVENOUS at 01:55

## 2018-01-01 RX ADMIN — ENOXAPARIN SODIUM 50 MG: 60 INJECTION SUBCUTANEOUS at 23:26

## 2018-01-01 RX ADMIN — NYSTATIN 500000 UNITS: 100000 SUSPENSION ORAL at 23:28

## 2018-01-01 RX ADMIN — METHYLPREDNISOLONE SODIUM SUCCINATE 30 MG: 40 INJECTION, POWDER, FOR SOLUTION INTRAMUSCULAR; INTRAVENOUS at 21:01

## 2018-01-01 RX ADMIN — AMOXICILLIN AND CLAVULANATE POTASSIUM 1 TABLET: 875; 125 TABLET, FILM COATED ORAL at 21:28

## 2018-01-01 RX ADMIN — SODIUM CHLORIDE 2.25 G: 900 INJECTION, SOLUTION INTRAVENOUS at 16:22

## 2018-01-01 RX ADMIN — METHIMAZOLE 2.5 MG: 5 TABLET ORAL at 08:56

## 2018-01-01 RX ADMIN — SODIUM CHLORIDE 2.25 G: 900 INJECTION, SOLUTION INTRAVENOUS at 21:01

## 2018-01-01 RX ADMIN — BUDESONIDE 500 MCG: 0.5 INHALANT RESPIRATORY (INHALATION) at 19:55

## 2018-01-01 RX ADMIN — WATER 1 G: 1 INJECTION INTRAMUSCULAR; INTRAVENOUS; SUBCUTANEOUS at 01:00

## 2018-01-01 RX ADMIN — AZITHROMYCIN 500 MG: 250 TABLET, FILM COATED ORAL at 08:27

## 2018-01-01 RX ADMIN — TEMAZEPAM 30 MG: 15 CAPSULE ORAL at 23:29

## 2018-01-01 RX ADMIN — PAROXETINE HYDROCHLORIDE 20 MG: 20 TABLET, FILM COATED ORAL at 08:55

## 2018-01-01 RX ADMIN — METHYLPREDNISOLONE SODIUM SUCCINATE 30 MG: 40 INJECTION, POWDER, FOR SOLUTION INTRAMUSCULAR; INTRAVENOUS at 08:56

## 2018-01-01 RX ADMIN — IPRATROPIUM BROMIDE 0.5 MG: 0.5 SOLUTION RESPIRATORY (INHALATION) at 00:13

## 2018-01-01 RX ADMIN — METHIMAZOLE 2.5 MG: 5 TABLET ORAL at 08:28

## 2018-01-01 RX ADMIN — GUAIFENESIN 600 MG: 600 TABLET, EXTENDED RELEASE ORAL at 09:07

## 2018-01-01 RX ADMIN — FUROSEMIDE 20 MG: 10 INJECTION, SOLUTION INTRAMUSCULAR; INTRAVENOUS at 11:37

## 2018-01-01 RX ADMIN — ENOXAPARIN SODIUM 50 MG: 60 INJECTION SUBCUTANEOUS at 00:03

## 2018-01-01 RX ADMIN — NYSTATIN 500000 UNITS: 100000 SUSPENSION ORAL at 21:29

## 2018-01-01 RX ADMIN — BUDESONIDE 500 MCG: 0.5 INHALANT RESPIRATORY (INHALATION) at 07:54

## 2018-01-01 RX ADMIN — PANTOPRAZOLE SODIUM 40 MG: 40 TABLET, DELAYED RELEASE ORAL at 06:42

## 2018-01-01 RX ADMIN — SODIUM CHLORIDE 2.25 G: 900 INJECTION, SOLUTION INTRAVENOUS at 10:07

## 2018-01-01 RX ADMIN — ACETAMINOPHEN 650 MG: 325 TABLET ORAL at 00:03

## 2018-01-01 RX ADMIN — PANTOPRAZOLE SODIUM 40 MG: 40 TABLET, DELAYED RELEASE ORAL at 08:32

## 2018-01-01 RX ADMIN — ACETAMINOPHEN 650 MG: 325 TABLET ORAL at 21:00

## 2018-01-01 RX ADMIN — METHYLPREDNISOLONE SODIUM SUCCINATE 40 MG: 125 INJECTION, POWDER, FOR SOLUTION INTRAMUSCULAR; INTRAVENOUS at 01:41

## 2018-01-01 RX ADMIN — OSELTAMIVIR PHOSPHATE 30 MG: 30 CAPSULE ORAL at 09:00

## 2018-01-01 RX ADMIN — DILTIAZEM HYDROCHLORIDE 30 MG: 30 TABLET, FILM COATED ORAL at 17:56

## 2018-01-01 RX ADMIN — IPRATROPIUM BROMIDE 0.5 MG: 0.5 SOLUTION RESPIRATORY (INHALATION) at 04:55

## 2018-01-01 RX ADMIN — IPRATROPIUM BROMIDE 0.5 MG: 0.5 SOLUTION RESPIRATORY (INHALATION) at 07:54

## 2018-01-01 RX ADMIN — ASPIRIN 81 MG 324 MG: 81 TABLET ORAL at 00:53

## 2018-01-01 RX ADMIN — BUDESONIDE 500 MCG: 0.5 INHALANT RESPIRATORY (INHALATION) at 20:09

## 2018-01-01 RX ADMIN — SODIUM CHLORIDE 2.25 G: 900 INJECTION, SOLUTION INTRAVENOUS at 04:00

## 2018-01-01 RX ADMIN — TEMAZEPAM 15 MG: 15 CAPSULE ORAL at 21:39

## 2018-01-01 RX ADMIN — GUAIFENESIN 600 MG: 600 TABLET, EXTENDED RELEASE ORAL at 21:15

## 2018-01-01 RX ADMIN — FUROSEMIDE 20 MG: 10 INJECTION, SOLUTION INTRAMUSCULAR; INTRAVENOUS at 12:23

## 2018-01-01 RX ADMIN — METHYLPREDNISOLONE SODIUM SUCCINATE 40 MG: 40 INJECTION, POWDER, FOR SOLUTION INTRAMUSCULAR; INTRAVENOUS at 23:26

## 2018-01-01 RX ADMIN — IPRATROPIUM BROMIDE 0.5 MG: 0.5 SOLUTION RESPIRATORY (INHALATION) at 12:21

## 2018-01-01 RX ADMIN — GUAIFENESIN 600 MG: 600 TABLET, EXTENDED RELEASE ORAL at 08:28

## 2018-01-01 RX ADMIN — DILTIAZEM HYDROCHLORIDE 30 MG: 30 TABLET, FILM COATED ORAL at 06:42

## 2018-01-01 RX ADMIN — SODIUM CHLORIDE 2.25 G: 900 INJECTION, SOLUTION INTRAVENOUS at 05:00

## 2018-01-01 RX ADMIN — OSELTAMIVIR PHOSPHATE 30 MG: 30 CAPSULE ORAL at 17:17

## 2018-01-01 RX ADMIN — NYSTATIN 500000 UNITS: 100000 SUSPENSION ORAL at 12:23

## 2018-01-01 RX ADMIN — POTASSIUM CHLORIDE 40 MEQ: 20 SOLUTION ORAL at 17:17

## 2018-01-01 RX ADMIN — ONDANSETRON HYDROCHLORIDE 4 MG: 4 TABLET, FILM COATED ORAL at 15:12

## 2018-01-01 RX ADMIN — PAROXETINE HYDROCHLORIDE 20 MG: 20 TABLET, FILM COATED ORAL at 08:32

## 2018-01-01 RX ADMIN — PAROXETINE HYDROCHLORIDE 20 MG: 20 TABLET, FILM COATED ORAL at 08:28

## 2018-01-01 RX ADMIN — DILTIAZEM HYDROCHLORIDE 30 MG: 30 TABLET, FILM COATED ORAL at 15:57

## 2018-01-01 RX ADMIN — ENOXAPARIN SODIUM 50 MG: 60 INJECTION SUBCUTANEOUS at 12:23

## 2018-01-01 RX ADMIN — NYSTATIN 500000 UNITS: 100000 SUSPENSION ORAL at 18:00

## 2018-01-01 RX ADMIN — SODIUM CHLORIDE 2.25 G: 900 INJECTION, SOLUTION INTRAVENOUS at 17:56

## 2018-01-01 RX ADMIN — IPRATROPIUM BROMIDE 0.5 MG: 0.5 SOLUTION RESPIRATORY (INHALATION) at 15:22

## 2018-01-01 RX ADMIN — NYSTATIN 500000 UNITS: 100000 SUSPENSION ORAL at 08:32

## 2018-01-01 RX ADMIN — INSULIN LISPRO 2 UNITS: 100 INJECTION, SOLUTION INTRAVENOUS; SUBCUTANEOUS at 15:57

## 2018-01-01 RX ADMIN — INSULIN LISPRO 2 UNITS: 100 INJECTION, SOLUTION INTRAVENOUS; SUBCUTANEOUS at 12:46

## 2018-01-01 RX ADMIN — INSULIN LISPRO 2 UNITS: 100 INJECTION, SOLUTION INTRAVENOUS; SUBCUTANEOUS at 12:23

## 2018-01-01 RX ADMIN — IPRATROPIUM BROMIDE 0.5 MG: 0.5 SOLUTION RESPIRATORY (INHALATION) at 11:54

## 2018-01-01 RX ADMIN — IPRATROPIUM BROMIDE 0.5 MG: 0.5 SOLUTION RESPIRATORY (INHALATION) at 20:25

## 2018-01-01 RX ADMIN — HEPARIN SODIUM 5000 UNITS: 5000 INJECTION, SOLUTION INTRAVENOUS; SUBCUTANEOUS at 06:50

## 2018-01-01 RX ADMIN — IPRATROPIUM BROMIDE 0.5 MG: 0.5 SOLUTION RESPIRATORY (INHALATION) at 15:27

## 2018-01-01 RX ADMIN — NYSTATIN 500000 UNITS: 100000 SUSPENSION ORAL at 08:56

## 2018-01-01 RX ADMIN — GUAIFENESIN 600 MG: 600 TABLET, EXTENDED RELEASE ORAL at 08:56

## 2018-01-01 RX ADMIN — GUAIFENESIN 600 MG: 600 TABLET, EXTENDED RELEASE ORAL at 21:28

## 2018-01-01 RX ADMIN — ENOXAPARIN SODIUM 50 MG: 60 INJECTION SUBCUTANEOUS at 23:25

## 2018-01-01 RX ADMIN — GUAIFENESIN 600 MG: 600 TABLET, EXTENDED RELEASE ORAL at 20:59

## 2018-01-01 RX ADMIN — BUDESONIDE 500 MCG: 0.5 INHALANT RESPIRATORY (INHALATION) at 12:26

## 2018-01-01 RX ADMIN — SODIUM CHLORIDE 1000 MG: 900 INJECTION, SOLUTION INTRAVENOUS at 10:16

## 2018-01-01 RX ADMIN — ENOXAPARIN SODIUM 50 MG: 60 INJECTION SUBCUTANEOUS at 12:41

## 2018-01-01 RX ADMIN — BUDESONIDE 500 MCG: 0.5 INHALANT RESPIRATORY (INHALATION) at 20:42

## 2018-01-01 RX ADMIN — IPRATROPIUM BROMIDE 0.5 MG: 0.5 SOLUTION RESPIRATORY (INHALATION) at 12:25

## 2018-01-01 RX ADMIN — DILTIAZEM HYDROCHLORIDE 30 MG: 30 TABLET, FILM COATED ORAL at 08:32

## 2018-01-01 RX ADMIN — OSELTAMIVIR PHOSPHATE 30 MG: 30 CAPSULE ORAL at 20:59

## 2018-01-01 RX ADMIN — NYSTATIN 500000 UNITS: 100000 SUSPENSION ORAL at 12:45

## 2018-01-01 RX ADMIN — SODIUM CHLORIDE 500 MG: 900 INJECTION, SOLUTION INTRAVENOUS at 01:42

## 2018-01-01 RX ADMIN — IPRATROPIUM BROMIDE 0.5 MG: 0.5 SOLUTION RESPIRATORY (INHALATION) at 19:55

## 2018-01-01 RX ADMIN — DILTIAZEM HYDROCHLORIDE 30 MG: 30 TABLET, FILM COATED ORAL at 12:23

## 2018-01-01 RX ADMIN — SODIUM CHLORIDE 2.25 G: 900 INJECTION, SOLUTION INTRAVENOUS at 09:01

## 2018-01-01 RX ADMIN — IPRATROPIUM BROMIDE 0.5 MG: 0.5 SOLUTION RESPIRATORY (INHALATION) at 16:07

## 2018-01-01 RX ADMIN — AMOXICILLIN AND CLAVULANATE POTASSIUM 1 TABLET: 875; 125 TABLET, FILM COATED ORAL at 08:28

## 2018-01-01 RX ADMIN — DILTIAZEM HYDROCHLORIDE 30 MG: 30 TABLET, FILM COATED ORAL at 11:37

## 2018-01-01 RX ADMIN — DILTIAZEM HYDROCHLORIDE 30 MG: 30 TABLET, FILM COATED ORAL at 12:21

## 2018-01-01 RX ADMIN — DILTIAZEM HYDROCHLORIDE 30 MG: 30 TABLET, FILM COATED ORAL at 16:29

## 2018-01-01 RX ADMIN — FUROSEMIDE 20 MG: 10 INJECTION, SOLUTION INTRAMUSCULAR; INTRAVENOUS at 00:55

## 2018-01-01 RX ADMIN — PAROXETINE HYDROCHLORIDE 20 MG: 20 TABLET, FILM COATED ORAL at 09:07

## 2018-01-01 RX ADMIN — METHIMAZOLE 2.5 MG: 5 TABLET ORAL at 08:31

## 2018-01-01 RX ADMIN — SODIUM CHLORIDE 500 MG: 900 INJECTION, SOLUTION INTRAVENOUS at 02:00

## 2018-01-01 RX ADMIN — OSELTAMIVIR PHOSPHATE 30 MG: 30 CAPSULE ORAL at 11:37

## 2018-01-01 RX ADMIN — NYSTATIN 500000 UNITS: 100000 SUSPENSION ORAL at 17:56

## 2018-01-01 RX ADMIN — PREDNISONE 40 MG: 20 TABLET ORAL at 08:28

## 2018-01-01 RX ADMIN — TEMAZEPAM 30 MG: 15 CAPSULE ORAL at 23:26

## 2018-01-01 RX ADMIN — SODIUM CHLORIDE 1000 ML: 900 INJECTION, SOLUTION INTRAVENOUS at 23:41

## 2018-01-01 RX ADMIN — ACETAMINOPHEN 650 MG: 325 TABLET ORAL at 12:49

## 2018-01-01 RX ADMIN — IPRATROPIUM BROMIDE 0.5 MG: 0.5 SOLUTION RESPIRATORY (INHALATION) at 00:06

## 2018-01-01 RX ADMIN — METHYLPREDNISOLONE SODIUM SUCCINATE 40 MG: 40 INJECTION, POWDER, FOR SOLUTION INTRAMUSCULAR; INTRAVENOUS at 05:34

## 2018-01-01 RX ADMIN — METHIMAZOLE 2.5 MG: 5 TABLET ORAL at 09:00

## 2018-01-01 RX ADMIN — FUROSEMIDE 40 MG: 10 INJECTION, SOLUTION INTRAMUSCULAR; INTRAVENOUS at 09:07

## 2018-01-01 RX ADMIN — ENOXAPARIN SODIUM 50 MG: 60 INJECTION SUBCUTANEOUS at 11:38

## 2018-01-01 RX ADMIN — DILTIAZEM HYDROCHLORIDE 30 MG: 30 TABLET, FILM COATED ORAL at 16:22

## 2018-01-01 RX ADMIN — METHYLPREDNISOLONE SODIUM SUCCINATE 40 MG: 40 INJECTION, POWDER, FOR SOLUTION INTRAMUSCULAR; INTRAVENOUS at 12:21

## 2018-01-01 RX ADMIN — DILTIAZEM HYDROCHLORIDE 30 MG: 30 TABLET, FILM COATED ORAL at 12:46

## 2018-01-01 RX ADMIN — SODIUM CHLORIDE 2.25 G: 900 INJECTION, SOLUTION INTRAVENOUS at 11:38

## 2018-01-01 RX ADMIN — NYSTATIN 500000 UNITS: 100000 SUSPENSION ORAL at 08:28

## 2018-01-01 RX ADMIN — NYSTATIN 500000 UNITS: 100000 SUSPENSION ORAL at 21:09

## 2018-01-01 RX ADMIN — FUROSEMIDE 20 MG: 20 TABLET ORAL at 08:28

## 2018-01-01 RX ADMIN — GUAIFENESIN 600 MG: 600 TABLET, EXTENDED RELEASE ORAL at 08:32

## 2018-01-01 RX ADMIN — SODIUM CHLORIDE 500 MG: 900 INJECTION, SOLUTION INTRAVENOUS at 02:34

## 2018-01-01 RX ADMIN — METHYLPREDNISOLONE SODIUM SUCCINATE 40 MG: 40 INJECTION, POWDER, FOR SOLUTION INTRAMUSCULAR; INTRAVENOUS at 17:17

## 2018-01-01 RX ADMIN — OSELTAMIVIR PHOSPHATE 30 MG: 30 CAPSULE ORAL at 08:55

## 2018-01-01 RX ADMIN — ENOXAPARIN SODIUM 50 MG: 60 INJECTION SUBCUTANEOUS at 12:45

## 2018-01-14 NOTE — IP AVS SNAPSHOT
[address] [address] 
[phone] Patient: Kristyn Israel MRN: [id number] About your hospitalization You were admitted on:  January 15, 2018 You last received care in the:  [address] [place] [address] You were discharged on:  January 18, 2018 Why you were hospitalized Your primary diagnosis was:  Acute Respiratory Insufficiency Your diagnoses also included:  Copd Exacerbation (Hcc), Infiltrate Noted On Imaging Study, Elevated Brain Natriuretic Peptide (Bnp) Level, Fluid Overload, Atrial Fibrillation With Rvr (Hcc), History Of Lung Cancer, S/P Lobectomy Of Lung, Positive D Dimer, Sirs (Systemic Inflammatory Response Syndrome) (Hcc), Pna (Pneumonia), Hypoxia, Acquired Hypothyroidism, Htn (Hypertension), Acute Respiratory Disease, Chf Exacerbation (Hcc), Panlobular Emphysema (Hcc), Right Lower Lobe Lung Mass Follow-up Information Follow up With Details Comments Contact [facility] is responsible for making arrangements for physician appointments. [phone] Ilda Devine MD   [address] [place] [address] 
[phone] Discharge Orders None A check elvin indicates which time of day the medication should be taken. My Medications START taking these medications Instructions Each Dose to Equal  
 Morning Noon Evening Bedtime  
 amoxicillin-clavulanate 875-125 mg per tablet Commonly known as:  AUGMENTIN Your last dose was: Your next dose is: Take 1 Tab by mouth two (2) times a day. 1 Tab  
    
   
   
   
  
 azithromycin 500 mg Tab Commonly known as:  Chiquita Olga Your last dose was: Your next dose is: Take 1 Tab by mouth daily. 500 mg  
    
   
   
   
  
 budesonide 0.5 mg/2 mL Nbsp Commonly known as:  PULMICORT Your last dose was: Your next dose is:    
   
   
 2 mL by Nebulization route two (2) times a day. 500 mcg  
    
   
   
   
  
 dilTIAZem  mg ER capsule Commonly known as:  CARDIZEM CD Your last dose was: Your next dose is: Take 1 Cap by mouth daily. 120 mg  
    
   
   
   
  
 furosemide 20 mg tablet Commonly known as:  LASIX Your last dose was: Your next dose is: Take 1 Tab by mouth daily. 20 mg  
    
   
   
   
  
 ipratropium 0.02 % Soln Commonly known as:  ATROVENT Your last dose was: Your next dose is:    
   
   
 2.5 mL by Nebulization route every four (4) hours as needed. 0.5 mg  
    
   
   
   
  
 nystatin 100,000 unit/mL suspension Commonly known as:  MYCOSTATIN Your last dose was: Your next dose is: Take 5 mL by mouth four (4) times daily for 7 days. swish and spit 1 tsp  
    
   
   
   
  
 oseltamivir 30 mg capsule Commonly known as:  TAMIFLU Your last dose was: Your next dose is: Take 1 Cap by mouth every twelve (12) hours every twelve (12) hours. 30 mg  
    
   
   
   
  
 oxyCODONE 20 mg/mL concentrated solution Commonly known as:  Joy Gardner Your last dose was: Your next dose is: Take 0.25 mL by mouth every three (3) hours as needed (pain or SOB). Max Daily Amount: 40 mg.  
 5 mg  
    
   
   
   
  
 predniSONE 20 mg tablet Commonly known as:  Abhijeet Abrams Your last dose was: Your next dose is:    
   
   
 2 tab PO daily X 3 days, then 1 tab PO daily X 4 days, then 1/2 tab daily X 4 days CHANGE how you take these medications Instructions Each Dose to Equal  
 Morning Noon Evening Bedtime  
 methIMAzole 5 mg tablet Commonly known as:  TAPAZOLE What changed:  Another medication with the same name was removed.  Continue taking this medication, and follow the directions you see here. Your last dose was: Your next dose is: Take 5 mg by mouth daily. 5 mg CONTINUE taking these medications Instructions Each Dose to Equal  
 Morning Noon Evening Bedtime PARoxetine 10 mg tablet Commonly known as:  PAXIL Your last dose was: Your next dose is: Take 20 mg by mouth daily. 20 mg RESTORIL 30 mg capsule Generic drug:  temazepam  
   
Your last dose was: Your next dose is: Take 30 mg by mouth nightly as needed for Sleep. 30 mg  
    
   
   
   
  
  
STOP taking these medications DEMEROL 50 mg tablet Generic drug:  meperidine NORVASC PO  
   
  
 promethazine 25 mg tablet Commonly known as:  PHENERGAN Where to Get Your Medications These medications were sent to [place], [place] - [address] [address]  [address] [address], [place] [address] Phone:  [phone]  
  amoxicillin-clavulanate 875-125 mg per tablet  
 azithromycin 500 mg Tab  
 budesonide 0.5 mg/2 mL Nbsp  
 dilTIAZem  mg ER capsule  
 furosemide 20 mg tablet  
 ipratropium 0.02 % Soln  
 nystatin 100,000 unit/mL suspension  
 oseltamivir 30 mg capsule  
 predniSONE 20 mg tablet Information on where to get these meds will be given to you by the nurse or doctor. ! Ask your nurse or doctor about these medications  
  oxyCODONE 20 mg/mL concentrated solution Discharge Instructions DISCHARGE SUMMARY from Nurse PATIENT INSTRUCTIONS: 
 
Recommended activity: Activity as tolerated. *  Please give a list of your current medications to your Primary Care Provider. *  Please update this list whenever your medications are discontinued, doses are 
    changed, or new medications (including over-the-counter products) are added. *  Please carry medication information at all times in case of emergency situations. These are general instructions for a healthy lifestyle: No smoking/ No tobacco products/ Avoid exposure to second hand smoke Surgeon General's Warning:  Quitting smoking now greatly reduces serious risk to your health. Obesity, smoking, and sedentary lifestyle greatly increases your risk for illness A healthy diet, regular physical exercise & weight monitoring are important for maintaining a healthy lifestyle You may be retaining fluid if you have a history of heart failure or if you experience any of the following symptoms:  Weight gain of 3 pounds or more overnight or 5 pounds in a week, increased swelling in our hands or feet or shortness of breath while lying flat in bed. Please call your doctor as soon as you notice any of these symptoms; do not wait until your next office visit. Recognize signs and symptoms of STROKE: 
 
F-face looks uneven A-arms unable to move or move unevenly S-speech slurred or non-existent T-time-call 911 as soon as signs and symptoms begin-DO NOT go Back to bed or wait to see if you get better-TIME IS BRAIN. Warning Signs of HEART ATTACK Call 911 if you have these symptoms: 
? Chest discomfort. Most heart attacks involve discomfort in the center of the chest that lasts more than a few minutes, or that goes away and comes back. It can feel like uncomfortable pressure, squeezing, fullness, or pain. ? Discomfort in other areas of the upper body. Symptoms can include pain or discomfort in one or both arms, the back, neck, jaw, or stomach. ? Shortness of breath with or without chest discomfort. ? Other signs may include breaking out in a cold sweat, nausea, or lightheadedness. Don't wait more than five minutes to call [address]! Fast action can save your life.  Calling 911 is almost always the fastest way to get lifesaving treatment. Emergency Medical Services staff can begin treatment when they arrive  up to an hour sooner than if someone gets to the hospital by car. The discharge information has been reviewed with the patient. The patient verbalized understanding. Discharge medications reviewed with the patient and appropriate educational materials and side effects teaching were provided. ___________________________________________________________________________________________________________________________________ Chronic Obstructive Pulmonary Disease (COPD): Care Instructions Your Care Instructions Chronic obstructive pulmonary disease (COPD) is a general term for a group of lung diseases, including emphysema and chronic bronchitis. People with COPD have decreased airflow in and out of the lungs, which makes it hard to breathe. The airways also can get clogged with thick mucus. Cigarette smoking is a major cause of COPD. Although there is no cure for COPD, you can slow its progress. Following your treatment plan and taking care of yourself can help you feel better and live longer. Follow-up care is a key part of your treatment and safety. Be sure to make and go to all appointments, and call your doctor if you are having problems. It's also a good idea to know your test results and keep a list of the medicines you take. How can you care for yourself at home? ?Staying healthy ? · Do not smoke. This is the most important step you can take to prevent more damage to your lungs. If you need help quitting, talk to your doctor about stop-smoking programs and medicines. These can increase your chances of quitting for good. ? · Avoid colds and flu. Get a pneumococcal vaccine shot. If you have had one before, ask your doctor whether you need a second dose. Get the flu vaccine every fall. If you must be around people with colds or the flu, wash your hands often. ? · Avoid secondhand smoke, air pollution, and high altitudes. Also avoid cold, dry air and hot, humid air. Stay at home with your windows closed when air pollution is bad. ?Medicines and oxygen therapy ? · Take your medicines exactly as prescribed. Call your doctor if you think you are having a problem with your medicine. ? · You may be taking medicines such as: ¨ Bronchodilators. These help open your airways and make breathing easier. Bronchodilators are either short-acting (work for 6 to 9 hours) or long-acting (work for 24 hours). You inhale most bronchodilators, so they start to act quickly. Always carry your quick-relief inhaler with you in case you need it while you are away from home. ¨ Corticosteroids (prednisone, budesonide). These reduce airway inflammation. They come in pill or inhaled form. You must take these medicines every day for them to work well. ? · A spacer may help you get more inhaled medicine to your lungs. Ask your doctor or pharmacist if a spacer is right for you. If it is, ask how to use it properly. ? · Do not take any vitamins, over-the-counter medicine, or herbal products without talking to your doctor first.  
? · If your doctor prescribed antibiotics, take them as directed. Do not stop taking them just because you feel better. You need to take the full course of antibiotics. ? · Oxygen therapy boosts the amount of oxygen in your blood and helps you breathe easier. Use the flow rate your doctor has recommended, and do not change it without talking to your doctor first.  
Activity ? · Get regular exercise. Walking is an easy way to get exercise. Start out slowly, and walk a little more each day. ? · Pay attention to your breathing. You are exercising too hard if you cannot talk while you are exercising. ? · Take short rest breaks when doing household chores and other activities.   
? · Learn breathing methods-such as breathing through pursed lips-to help you become less short of breath. ? · If your doctor has not set you up with a pulmonary rehabilitation program, talk to him or her about whether rehab is right for you. Rehab includes exercise programs, education about your disease and how to manage it, help with diet and other changes, and emotional support. Diet ? · Eat regular, healthy meals. Use bronchodilators about 1 hour before you eat to make it easier to eat. Eat several small meals instead of three large ones. Drink beverages at the end of the meal. Avoid foods that are hard to chew. ? · Eat foods that contain protein so that you do not lose muscle mass. ? · Talk with your doctor if you gain too much weight or if you lose weight without trying. ?Mental health ? · Talk to your family, friends, or a therapist about your feelings. It is normal to feel frightened, angry, hopeless, helpless, and even guilty. Talking openly about bad feelings can help you cope. If these feelings last, talk to your doctor. When should you call for help? Call 911 anytime you think you may need emergency care. For example, call if: 
? · You have severe trouble breathing. ?Call your doctor now or seek immediate medical care if: 
? · You have new or worse trouble breathing. ? · You cough up blood. ? · You have a fever. ? Watch closely for changes in your health, and be sure to contact your doctor if: 
? · You cough more deeply or more often, especially if you notice more mucus or a change in the color of your mucus. ? · You have new or worse swelling in your legs or belly. ? · You are not getting better as expected. Where can you learn more? Go to http://chepe-yajaira.info/. Keila Kingston in the search box to learn more about \"Chronic Obstructive Pulmonary Disease (COPD): Care Instructions. \" Current as of: May 12, 2017 Content Version: 11.4 © 3314-0120 [employer].  Care instructions adapted under license by [address] (which disclaims liability or warranty for this information). If you have questions about a medical condition or this instruction, always ask your healthcare professional. [address] any warranty or liability for your use of this information. [facility] Announcement We are excited to announce that we are making your provider's discharge notes available to you in [facility]. You will see these notes when they are completed and signed by the physician that discharged you from your recent hospital stay. If you have any questions or concerns about any information you see in [facility], please call the Health Information Department where you were seen or reach out to your Primary Care Provider for more information about your plan of care. Introducing [facility]! Dear Rome Hunt: Thank you for requesting a [facility] account. Our records indicate that you already have an active [facility] account. You can access your account anytime at [email or web address] Admissions. [employer]/School Admissions Did you know that you can access your hospital and ER discharge instructions at any time in [facility]? You can also review all of your test results from your hospital stay or ER visit. Additional Information If you have questions, please visit the Frequently Asked Questions section of the [facility] website at [email or web address] Admissions/. Remember, [facility] is NOT to be used for urgent needs. For medical emergencies, dial 911. Now available from your iPhone and Android! Unresulted Labs-Please follow up with your PCP about these lab tests Order Current Status CULTURE, BLOOD Preliminary result CULTURE, BLOOD Preliminary result EKG, 12 LEAD, INITIAL Preliminary result EKG, 12 LEAD, SUBSEQUENT Preliminary result Providers Seen During Your Hospitalization Provider Specialty Primary office phone Darinel Grant MD Emergency Medicine [phone] Guerline Saunders MD Internal Medicine [phone] Your Primary Care Physician (PCP) Primary Care Physician Office Phone Office Fax Charles Garcia [phone] You are allergic to the following Allergen Reactions Avelox (Moxifloxacin) Rash Swelling Recent Documentation Height Weight Breastfeeding? BMI OB Status Smoking Status 1.651 m 54.4 kg No 19.97 kg/m2 Hysterectomy Former Smoker Emergency Contacts Name Discharge Info Relation Home Work Mobile [facility] DISCHARGE CAREGIVER [3] Daughter [21] [phone] Patient Belongings The following personal items are in your possession at time of discharge: 
  Dental Appliances: At bedside, Uppers  Visual Aid: Glasses, With patient      Home Medications: None   Jewelry: None  Clothing: None    Other Valuables: At bedside, Other (comment) (black purse pt states no money or credit card)  Personal Items Sent to Safe:  (none) Discharge Instructions Attachments/References INFLUENZA (ENGLISH) Patient Handouts Influenza (Flu): Care Instructions Your Care Instructions Influenza (flu) is an infection in the lungs and breathing passages. It is caused by the influenza virus. There are different strains, or types, of the flu virus from year to year. Unlike the common cold, the flu comes on suddenly and the symptoms, such as a cough, congestion, fever, chills, fatigue, aches, and pains, are more severe. These symptoms may last up to 10 days. Although the flu can make you feel very sick, it usually doesn't cause serious health problems. Home treatment is usually all you need for flu symptoms. But your doctor may prescribe antiviral medicine to prevent other health problems, such as pneumonia, from developing.  Older people and those who have a long-term health condition, such as lung disease, are most at risk for having pneumonia or other health problems. Follow-up care is a key part of your treatment and safety. Be sure to make and go to all appointments, and call your doctor if you are having problems. It's also a good idea to know your test results and keep a list of the medicines you take. How can you care for yourself at home? · Get plenty of rest. 
· Drink plenty of fluids, enough so that your urine is light yellow or clear like water. If you have kidney, heart, or liver disease and have to limit fluids, talk with your doctor before you increase the amount of fluids you drink. · Take an over-the-counter pain medicine if needed, such as acetaminophen (Tylenol), ibuprofen (Advil, Motrin), or naproxen (Aleve), to relieve fever, headache, and muscle aches. Read and follow all instructions on the label. No one younger than 20 should take aspirin. It has been linked to Reye syndrome, a serious illness. · Do not smoke. Smoking can make the flu worse. If you need help quitting, talk to your doctor about stop-smoking programs and medicines. These can increase your chances of quitting for good. · Breathe moist air from a hot shower or from a sink filled with hot water to help clear a stuffy nose. · Before you use cough and cold medicines, check the label. These medicines may not be safe for young children or for people with certain health problems. · If the skin around your nose and lips becomes sore, put some petroleum jelly on the area. · To ease coughing: ¨ Drink fluids to soothe a scratchy throat. ¨ Suck on cough drops or plain hard candy. ¨ Take an over-the-counter cough medicine that contains dextromethorphan to help you get some sleep. Read and follow all instructions on the label. ¨ Raise your head at night with an extra pillow. This may help you rest if coughing keeps you awake. · Take any prescribed medicine exactly as directed. Call your doctor if you think you are having a problem with your medicine. To avoid spreading the flu · Wash your hands regularly, and keep your hands away from your face. · Stay home from school, work, and other public places until you are feeling better and your fever has been gone for at least 24 hours. The fever needs to have gone away on its own without the help of medicine. · Ask people living with you to talk to their doctors about preventing the flu. They may get antiviral medicine to keep from getting the flu from you. · To prevent the flu in the future, get a flu vaccine every fall. Encourage people living with you to get the vaccine. · Cover your mouth when you cough or sneeze. When should you call for help? Call 911 anytime you think you may need emergency care. For example, call if: 
? · You have severe trouble breathing. ?Call your doctor now or seek immediate medical care if: 
? · You have new or worse trouble breathing. ? · You seem to be getting much sicker. ? · You feel very sleepy or confused. ? · You have a new or higher fever. ? · You get a new rash. ? Watch closely for changes in your health, and be sure to contact your doctor if: 
? · You begin to get better and then get worse. ? · You are not getting better after 1 week. Where can you learn more? Go to http://chepe-yajaira.info/. Enter E505 in the search box to learn more about \"Influenza (Flu): Care Instructions. \" Current as of: May 12, 2017 Content Version: 11.4 © 4907-7521 [employer]. Care instructions adapted under license by [employer] (which disclaims liability or warranty for this information). If you have questions about a medical condition or this instruction, always ask your healthcare professional. [address] any warranty or liability for your use of this information. Please provide this summary of care documentation to your next provider. Signatures-by signing, you are acknowledging that this After Visit Summary has been reviewed with you and you have received a copy. Patient Signature:  ____________________________________________________________ Date:  ____________________________________________________________  
  
Bere Saver Provider Signature:  ____________________________________________________________ Date:  ____________________________________________________________

## 2018-01-14 NOTE — IP AVS SNAPSHOT
[address] [place] [id number] 
[phone] Patient: Dulce Tracey MRN: [id number] A check elvin indicates which time of day the medication should be taken. My Medications START taking these medications Instructions Each Dose to Equal  
 Morning Noon Evening Bedtime  
 amoxicillin-clavulanate 875-125 mg per tablet Commonly known as:  AUGMENTIN Your last dose was: Your next dose is: Take 1 Tab by mouth two (2) times a day. 1 Tab  
    
   
   
   
  
 azithromycin 500 mg Tab Commonly known as:  Chucky Simmonds Your last dose was: Your next dose is: Take 1 Tab by mouth daily. 500 mg  
    
   
   
   
  
 budesonide 0.5 mg/2 mL Nbsp Commonly known as:  PULMICORT Your last dose was: Your next dose is:    
   
   
 2 mL by Nebulization route two (2) times a day. 500 mcg  
    
   
   
   
  
 dilTIAZem  mg ER capsule Commonly known as:  CARDIZEM CD Your last dose was: Your next dose is: Take 1 Cap by mouth daily. 120 mg  
    
   
   
   
  
 furosemide 20 mg tablet Commonly known as:  LASIX Your last dose was: Your next dose is: Take 1 Tab by mouth daily. 20 mg  
    
   
   
   
  
 ipratropium 0.02 % Soln Commonly known as:  ATROVENT Your last dose was: Your next dose is:    
   
   
 2.5 mL by Nebulization route every four (4) hours as needed. 0.5 mg  
    
   
   
   
  
 nystatin 100,000 unit/mL suspension Commonly known as:  MYCOSTATIN Your last dose was: Your next dose is: Take 5 mL by mouth four (4) times daily for 7 days. swish and spit 1 tsp  
    
   
   
   
  
 oseltamivir 30 mg capsule Commonly known as:  TAMIFLU Your last dose was: Your next dose is: Take 1 Cap by mouth every twelve (12) hours every twelve (12) hours. 30 mg  
    
   
   
   
  
 oxyCODONE 20 mg/mL concentrated solution Commonly known as:  Maria Ines Oneal Your last dose was: Your next dose is: Take 0.25 mL by mouth every three (3) hours as needed (pain or SOB). Max Daily Amount: 40 mg.  
 5 mg  
    
   
   
   
  
 predniSONE 20 mg tablet Commonly known as:  Maryann Ray Your last dose was: Your next dose is:    
   
   
 2 tab PO daily X 3 days, then 1 tab PO daily X 4 days, then 1/2 tab daily X 4 days CHANGE how you take these medications Instructions Each Dose to Equal  
 Morning Noon Evening Bedtime  
 methIMAzole 5 mg tablet Commonly known as:  TAPAZOLE What changed:  Another medication with the same name was removed. Continue taking this medication, and follow the directions you see here. Your last dose was: Your next dose is: Take 5 mg by mouth daily. 5 mg CONTINUE taking these medications Instructions Each Dose to Equal  
 Morning Noon Evening Bedtime PARoxetine 10 mg tablet Commonly known as:  PAXIL Your last dose was: Your next dose is: Take 20 mg by mouth daily. 20 mg RESTORIL 30 mg capsule Generic drug:  temazepam  
   
Your last dose was: Your next dose is: Take 30 mg by mouth nightly as needed for Sleep. 30 mg  
    
   
   
   
  
  
STOP taking these medications DEMEROL 50 mg tablet Generic drug:  meperidine NORVASC PO  
   
  
 promethazine 25 mg tablet Commonly known as:  PHENERGAN Where to Get Your Medications These medications were sent to [place], [place] - [id number] [address]  [address] [address], [place] [address] Phone:  [phone] amoxicillin-clavulanate 875-125 mg per tablet  
 azithromycin 500 mg Tab  
 budesonide 0.5 mg/2 mL Nbsp  
 dilTIAZem  mg ER capsule  
 furosemide 20 mg tablet  
 ipratropium 0.02 % Soln  
 nystatin 100,000 unit/mL suspension  
 oseltamivir 30 mg capsule  
 predniSONE 20 mg tablet Information on where to get these meds will be given to you by the nurse or doctor. ! Ask your nurse or doctor about these medications  
  oxyCODONE 20 mg/mL concentrated solution

## 2018-01-14 NOTE — Clinical Note
Status[de-identified] Inpatient [101] Type of Bed: Intensive Care [6] Inpatient Hospitalization Certified Necessary for the Following Reasons: 9. Other (further clarification in H&P documentation) Admitting Diagnosis: Acute respiratory insufficiency [239718] Admitting Diagnosis: Infiltrate noted on imaging study [[id number]] Admitting Diagnosis: [facility] [[id number]] Admitting Physician: Meenakshi Crawley [[id number]] Attending Physician: Meenakshi Crawley [[id number]] Estimated Length of Stay: 5-7 Midnights Discharge Plan[de-identified] Home with Office Follow-up

## 2018-01-15 PROBLEM — J18.9 PNA (PNEUMONIA): Status: ACTIVE | Noted: 2018-01-01

## 2018-01-15 PROBLEM — E03.9 ACQUIRED HYPOTHYROIDISM: Status: ACTIVE | Noted: 2018-01-01

## 2018-01-15 PROBLEM — I10 HTN (HYPERTENSION): Status: ACTIVE | Noted: 2018-01-01

## 2018-01-15 PROBLEM — I48.91 ATRIAL FIBRILLATION WITH RVR (HCC): Status: ACTIVE | Noted: 2018-01-01

## 2018-01-15 PROBLEM — R65.10 SIRS (SYSTEMIC INFLAMMATORY RESPONSE SYNDROME) (HCC): Status: ACTIVE | Noted: 2018-01-01

## 2018-01-15 PROBLEM — R79.89 ELEVATED BRAIN NATRIURETIC PEPTIDE (BNP) LEVEL: Status: ACTIVE | Noted: 2018-01-01

## 2018-01-15 PROBLEM — J44.1 COPD EXACERBATION (HCC): Status: ACTIVE | Noted: 2018-01-01

## 2018-01-15 PROBLEM — R93.89 INFILTRATE NOTED ON IMAGING STUDY: Status: ACTIVE | Noted: 2018-01-01

## 2018-01-15 PROBLEM — R09.02 HYPOXIA: Status: ACTIVE | Noted: 2018-01-01

## 2018-01-15 PROBLEM — J06.9 ACUTE RESPIRATORY DISEASE: Status: ACTIVE | Noted: 2018-01-01

## 2018-01-15 PROBLEM — R79.89 POSITIVE D DIMER: Status: ACTIVE | Noted: 2018-01-01

## 2018-01-15 PROBLEM — R06.89 ACUTE RESPIRATORY INSUFFICIENCY: Status: ACTIVE | Noted: 2018-01-01

## 2018-01-15 PROBLEM — Z90.2 S/P LOBECTOMY OF LUNG: Status: ACTIVE | Noted: 2018-01-01

## 2018-01-15 PROBLEM — C34.90 LUNG CANCER (HCC): Status: ACTIVE | Noted: 2018-01-01

## 2018-01-15 PROBLEM — E87.70 FLUID OVERLOAD: Status: ACTIVE | Noted: 2018-01-01

## 2018-01-15 PROBLEM — I50.9 CHF EXACERBATION (HCC): Status: ACTIVE | Noted: 2018-01-01

## 2018-01-15 NOTE — PROGRESS NOTES
Pharmacy Dosing Services for Zosyn  Pharmacist Renal Dosing Progress Note for Jocelin Darby  Physician : Jocelyne Solares MD    The following medication: Zosyn was automatically dose-adjusted per [facility] P&T Committee Protocol, with respect to renal function. Consult provided for this   81 y.o. , female , for the indication of Pneumonia (CAP). Pt Weight:   Wt Readings from Last 1 Encounters:   01/14/18 51.7 kg (114 lb)         Previous Regimen Zosyn 3.375 gm Q6H   Serum Creatinine Lab Results   Component Value Date/Time    Creatinine 1.24 01/14/2018 11:30 PM    Creatinine, POC 1.1 01/14/2018 11:57 PM       Creatinine Clearance Estimated Creatinine Clearance: 29 mL/min (based on Cr of 1.24). BUN Lab Results   Component Value Date/Time    BUN 27 01/14/2018 11:30 PM    BUN, POC 27 01/14/2018 11:57 PM       Dosage changed to:  Zosyn 2.25 gm Q6H    Additional notes:      Pharmacy to continue to monitor patient daily. Will make dosage adjustments based upon changing renal function. Signed Srini Vazquez RPH.  Contact information:[phone]

## 2018-01-15 NOTE — PROGRESS NOTES
Palliative medicine consult noted and appreciated. Patient was sitting up in bed when I entered the room. She denied complaints of pain but admitted to feeling short of breath \"quite a bit today but better than yesterday\". She complained of \"not getting any rest\" due to frequent visits from staff members and \"alarms going off on all the machines\". No family was at bedside. Palliative medicine introduced. She stated understanding and stated she would like to speak with pm team. She further stated that \"if this is about Advance Directives I did all that\". She explained that she had completed one about 10 years ago and that her daughter Babita Jones was her MPOA. She added that she had a secondary MPOA but that person had passed away. She agreed that she may want to complete a new Advance Directive. She stated her daughter, Babita Jones, was at work today but might be available by telephone tomorrow. I called her daughter with permission. Babita Jones stated she would be available tomorrow at 1 pm to meet with PM team. Babita Jones also stated that she was aware that her mother had completed an Advance Directive and would attempt to find it and bring it in. Full note to follow tomorrow's goals of care meeting.     Sarah Caraballo RN

## 2018-01-15 NOTE — PROGRESS NOTES
Pt seen and examined. Ms. Yasmany Barlow and I had a long discussion about her ascending and transverese thoracic aortic aneurysm and the risk of rupture. We also discussed the available treatment options and Ms. Yasmany Barlow has declined any surgery or need for follow up given her DNR/DNI status. We will sign off at this time. She is aware that she can call us if she was to change her mind.

## 2018-01-15 NOTE — PROGRESS NOTES
Readmission Risk Assessment:     Moderate Risk and MSSP/Good Help ACO patients    RRAT Score:  13-20    Initial Assessment:chart reviewed met with patient at bedside,admitted for acute respiratory distress,pt states she lives at home with her daughter which is also her MPOA, pt uses home 18 @ noc but lately has been needing it more ofter,supplies thru [address] Patient, have home walker and cane, noted in palliative care note on today planning for meeting with daughter tomorrow at 1pm, cm will cont to remain available and review case after palliative team meets with daughter for d/c planning. Emergency Contact:      Pertinent Medical Hx:   Lung Ca COPD      PCP/Specialists: [id number] [facility] Services:       DME:          Moderate Risk Care Transition Plan:  1. Evaluate for [facility] or [facility], SNF, acute rehab, community care coordination of resources. 2. Involve patient/caregiver in assessment, planning, education and implement of intervention. 3. CM daily patient care huddles/interdisciplinary rounds. 4. PCP/Specialist appointment within 5  7 days made prior to discharge. 5. Facilitate transportation and logistics for follow-up appointments. 6. Medication reconciliation [id number] [facility]  7. Formal handoff between hospital provider and post-acute provider to transition patient  Handoff to [facility] Nurse Navigator or PCP practice.

## 2018-01-15 NOTE — ED NOTES
Pt received tachypneic with increased work of breathing. Suprasternal retractions noted. Oxygen sat initially 79% now 98% on non rebreather at 15L oxygen. Pt alert and oriented x4. Pt recently diagnosed with bronchitis but began having difficulty breathing at 5:30 this morning. Pt with wide qrs on monitor hr 160's-180s. MD Troy Hooper at bedside. Continue frequent monitoring.

## 2018-01-15 NOTE — ED PROVIDER NOTES
EMERGENCY DEPARTMENT HISTORY AND PHYSICAL EXAM    Date: 1/14/2018  Patient Name: Kailey Cancino    History of Presenting Illness     Chief Complaint   Patient presents with    Respiratory Distress         History Provided By: Patient    Chief Complaint: SOB  Duration: This AM  Timing:  Acute  Location: Chest  Severity: Severe  Associated Symptoms: denies any other associated signs or symptoms    Additional History (Context):   11:11 PM  Kailey Cancino is a 81 y.o. female with PMHX COPD, lung CA, and HTN who presents via EMS to the emergency department C/O severe SOB in respiratory distress, onset this AM. Notes no other associated sxs. Pt arrived to ED on NRB. Pt was evaluated and dx with bronchitis 2 days ago and rx abx but doesn't remember the name. Pt is on 2 L O2 at home. Pt's sats were in the 70's upon EMS arrival, HR in the 170's. Pt denies CP, fever, chills, or any other sxs or complaints. Pt is DNI and no CPR, but agrees to shock and meds if necessary.     PCP: Harini Sepulveda MD       Current Facility-Administered Medications   Medication Dose Route Frequency Provider Last Rate Last Dose    amLODIPine (NORVASC) tablet 5 mg  5 mg Oral DAILY Guillermo Espinoza MD        PARoxetine (PAXIL) tablet 20 mg  20 mg Oral DAILY Guillermo Espinoza MD        methIMAzole (TAPAZOLE) tablet 2.5 mg  2.5 mg Oral DAILY Guillermo Espinoza MD        temazepam (RESTORIL) capsule 30 mg  30 mg Oral QHS PRN Guillermo Espinoza MD        heparin (porcine) injection 5,000 Units  5,000 Units SubCUTAneous Q8H Luis Ott MD        albuterol-ipratropium (DUO-NEB) 2.5 MG-0.5 MG/3 ML  3 mL Nebulization Q4H PRN Guillermo Espinoza MD        guaiFENesin ER (MUCINEX) tablet 600 mg  600 mg Oral Q12H Guillermo Espinoza MD       [address] [place] [address] [START ON 1/16/2018] cefTRIAXone (ROCEPHIN) 1 g in sterile water (preservative free) 10 mL IV syringe  1 g IntraVENous Q24H Luis Ott MD        furosemide (LASIX) injection 40 mg  40 mg IntraVENous BID Guillermo Espinoza MD        magnesium sulfate 2 g/50 ml IVPB (premix or compounded)  2 g IntraVENous NOW Zita Peterson MD   Stopped at 01/15/18 0020       Past History     Past Medical History:  Past Medical History:   Diagnosis Date    [facility] obstructive pulmonary disease [facility] [facility]     Hypertension     Lung cancer ([facility] [facility]        Past Surgical History:  Past Surgical History:   Procedure Laterality Date    HX HYSTERECTOMY      HX OTHER SURGICAL      right lung lobe removal       Family History:  History reviewed. No pertinent family history. Social History:  Social History   Substance Use Topics    Smoking status: Former Smoker    Smokeless tobacco: None    Alcohol use No       Allergies: Allergies   Allergen Reactions    Avelox [Moxifloxacin] Rash and Swelling         Review of Systems   Review of Systems   Constitutional: Negative for chills and fever. Respiratory: Positive for shortness of breath. Cardiovascular: Negative for chest pain. All other systems reviewed and are negative. Physical Exam     Vitals:    01/14/18 2338 01/14/18 2339 01/15/18 0013 01/15/18 0053   BP:    112/64   Pulse: (!) 119 (!) 108  (!) 112   Resp: (!) 34 26     Temp:       SpO2: 100% 100% 95%    Weight:       Height:         Physical Exam   Constitutional: She is oriented to person, place, and time. She appears well-developed and well-nourished. HENT:   Head: Normocephalic and atraumatic. Right Ear: External ear normal.   Left Ear: External ear normal.   Nose: Nose normal.   Mouth/Throat: Oropharynx is clear and moist.   Eyes: Conjunctivae and EOM are normal. Pupils are equal, round, and reactive to light. Right eye exhibits no discharge. Left eye exhibits no discharge. No scleral icterus. Neck: Normal range of motion. Neck supple. JVD (possible) present. No tracheal deviation present. Cardiovascular: Normal heart sounds and intact distal pulses. An irregular rhythm present. Tachycardia present. No murmur heard.   Pulmonary/Chest: Accessory muscle usage (neck) present. She is in respiratory distress. She has wheezes (diffuse inspiratory and expiratory). Decreased air-movement rll and lll area   Abdominal: Soft. Bowel sounds are normal. She exhibits no distension. There is no tenderness. No HSM   Musculoskeletal: Normal range of motion. Neurological: She is alert and oriented to person, place, and time. She has normal reflexes. No focal motor weakness. Skin: Skin is warm and dry. No rash noted. Psychiatric: She has a normal mood and affect. Her behavior is normal.   Speaking in 3 word sentences   Nursing note and vitals reviewed. Diagnostic Study Results     Labs -     Recent Results (from the past 12 hour(s))   EKG, 12 LEAD, INITIAL    Collection Time: 01/14/18 11:18 PM   Result Value Ref Range    Ventricular Rate 182 BPM    Atrial Rate 182 BPM    QRS Duration 122 ms    Q-T Interval 274 ms    QTC Calculation (Bezet) 476 ms    Calculated R Axis -90 degrees    Calculated T Axis 61 degrees    Diagnosis       Suspect arm lead reversal, interpretation assumes no reversal  Undetermined rhythm  Possible Right ventricular hypertrophy  Inferior infarct , age undetermined  Anterolateral infarct , age undetermined  Abnormal ECG  When compared with ECG of 12-JAN-2016 12:41,  Significant changes have occurred     CBC WITH AUTOMATED DIFF    Collection Time: 01/14/18 11:30 PM   Result Value Ref Range    WBC 14.9 (H) 4.6 - 13.2 K/uL    RBC 4.06 (L) 4.20 - 5.30 M/uL    HGB 12.0 12.0 - 16.0 g/dL    HCT 37.4 35.0 - 45.0 %    MCV 92.1 74.0 - 97.0 FL    MCH 29.6 24.0 - 34.0 PG    MCHC 32.1 31.0 - 37.0 g/dL    RDW 14.9 (H) 11.6 - 14.5 %    PLATELET 835 024 - 909 K/uL    MPV 10.6 9.2 - 11.8 FL    NEUTROPHILS 82 (H) 40 - 73 %    LYMPHOCYTES 9 (L) 21 - 52 %    MONOCYTES 9 3 - 10 %    EOSINOPHILS 0 0 - 5 %    BASOPHILS 0 0 - 2 %    ABS. NEUTROPHILS 12.2 (H) 1.8 - 8.0 K/UL    ABS. LYMPHOCYTES 1.3 0.9 - 3.6 K/UL    ABS. MONOCYTES 1.3 (H) 0.05 - 1.2 K/UL    ABS.  EOSINOPHILS 0.0 0.0 - 0.4 K/UL    ABS. BASOPHILS 0.1 (H) 0.0 - 0.06 K/UL    DF AUTOMATED     METABOLIC PANEL, COMPREHENSIVE    Collection Time: 01/14/18 11:30 PM   Result Value Ref Range    Sodium 141 136 - 145 mmol/L    Potassium 3.6 3.5 - 5.5 mmol/L    Chloride 98 (L) 100 - 108 mmol/L    CO2 32 21 - 32 mmol/L    Anion gap 11 3.0 - 18 mmol/L    Glucose 128 (H) 74 - 99 mg/dL    BUN 27 (H) 7.0 - 18 MG/DL    Creatinine 1.24 0.6 - 1.3 MG/DL    BUN/Creatinine ratio 22 (H) 12 - 20      GFR est AA 50 (L) >60 ml/min/1.73m2    GFR est non-AA 42 (L) >60 ml/min/1.73m2    Calcium 8.9 8.5 - 10.1 MG/DL    Bilirubin, total 1.0 0.2 - 1.0 MG/DL    ALT (SGPT) 56 13 - 56 U/L    AST (SGOT) 73 (H) 15 - 37 U/L    Alk.  phosphatase 89 45 - 117 U/L    Protein, total 7.5 6.4 - 8.2 g/dL    Albumin 3.1 (L) 3.4 - 5.0 g/dL    Globulin 4.4 (H) 2.0 - 4.0 g/dL    A-G Ratio 0.7 (L) 0.8 - 1.7     MAGNESIUM    Collection Time: 01/14/18 11:30 PM   Result Value Ref Range    Magnesium 1.7 1.6 - 2.6 mg/dL   CARDIAC PANEL,(CK, CKMB & TROPONIN)    Collection Time: 01/14/18 11:30 PM   Result Value Ref Range     26 - 192 U/L    CK - MB 2.2 <3.6 ng/ml    CK-MB Index 1.9 0.0 - 4.0 %    Troponin-I, Qt. 0.45 (H) 0.00 - 0.06 NG/ML   NT-PRO BNP    Collection Time: 01/14/18 11:30 PM   Result Value Ref Range    NT pro-BNP 00693 (H) 0 - 1800 PG/ML   D DIMER    Collection Time: 01/14/18 11:30 PM   Result Value Ref Range    D DIMER 12.29 (H) <0.46 ug/ml(FEU)   POC TROPONIN-I    Collection Time: 01/14/18 11:31 PM   Result Value Ref Range    Troponin-I (POC) 0.24 () 0.00 - 0.08 ng/mL   EKG, 12 LEAD, SUBSEQUENT    Collection Time: 01/14/18 11:39 PM   Result Value Ref Range    Ventricular Rate 117 BPM    Atrial Rate 117 BPM    P-R Interval 166 ms    QRS Duration 114 ms    Q-T Interval 358 ms    QTC Calculation (Bezet) 499 ms    Calculated P Axis 87 degrees    Calculated R Axis -78 degrees    Calculated T Axis 89 degrees    Diagnosis       Sinus tachycardia with frequent premature ventricular complexes  Left axis deviation  Inferior infarct (cited on or before 14-JAN-2018)  Anterolateral infarct (cited on or before 14-JAN-2018)  Abnormal ECG  When compared with ECG of 14-JAN-2018 23:18,  Previous ECG has undetermined rhythm, needs review  Questionable change in initial forces of Inferior leads  T wave inversion now evident in Lateral leads     POC G3    Collection Time: 01/14/18 11:49 PM   Result Value Ref Range    Device: Non rebreather      Flow rate (POC) 15 L/M    FIO2 (POC) 90 %    pH (POC) 7.374 7.35 - 7.45      pCO2 (POC) 55.1 (H) 35.0 - 45.0 MMHG    pO2 (POC) 118 (H) 80 - 100 MMHG    HCO3 (POC) 32.1 (H) 22 - 26 MMOL/L    sO2 (POC) 98 (H) 92 - 97 %    Base excess (POC) 7 mmol/L    Allens test (POC) YES      Total resp. rate 25      Site RIGHT RADIAL      Specimen type (POC) ARTERIAL      Performed by Cyrus Esqueda    [facility]    Collection Time: 01/14/18 11:57 PM   Result Value Ref Range    CO2, POC 31 (H) 19 - 24 MMOL/L    Glucose,  (H) 74 - 106 MG/DL    BUN, POC 27 (H) 7 - 18 MG/DL    Creatinine, POC 1.1 0.6 - 1.3 MG/DL    GFRAA, POC 58 (L) >60 ml/min/1.73m2    GFRNA, POC 48 (L) >60 ml/min/1.73m2    Sodium,  136 - 145 MMOL/L    Potassium, POC 3.6 3.5 - 5.5 MMOL/L    Calcium, ionized (POC) 1.07 (L) 1.12 - 1.32 MMOL/L    Chloride, POC 96 (L) 100 - 108 MMOL/L    Anion gap, POC 19 10 - 20      Hematocrit, POC 58 (HH) 36 - 49 %    Hemoglobin, POC 19.7 (H) 12 - 16 G/DL       Radiologic Studies -    XR CHEST PORT   Final Result   IMPRESSION:      Mild interstitial density is now seen at the left costophrenic angle, perhaps   atelectasis versus developing infiltrate. The lungs elsewhere are unchanged. No pneumothorax or CHF.        As read by the radiologist.     CT Results  (Last 48 hours)    None        CXR Results  (Last 48 hours)               01/15/18 0018  XR CHEST PORT Final result    Impression:  IMPRESSION:       Mild interstitial density is now seen at the left costophrenic angle, perhaps   atelectasis versus developing infiltrate. The lungs elsewhere are unchanged. No pneumothorax or CHF. Narrative:  Portable chest xray        Reason for exam: Chest pain       Images: 1       Comparison:  Chest x-ray 1/12/2016 and. Chest CT 11/10/2010       Findings: The cardiomediastinal contours are stable. The aorta is calcified and   ectatic. Postoperative changes are again noted from right lung resection. There clips at   the right hilum. There is volume loss with elevated right hemidiaphragm. There   is some scarring or atelectasis at the right lung base. Mild emphysematous   changes are present. There is now some interstitial opacity present at the left costophrenic angle, a   little denser than before. Atelectasis or developing infiltrate could be   present. The lungs elsewhere are stable. No pneumothorax. No large effusions. Medical Decision Making   I am the first provider for this patient. I reviewed the vital signs, available nursing notes, past medical history, past surgical history, family history and social history. Vital Signs-Reviewed the patient's vital signs. Pulse Oximetry Analysis - 79% on NRB    Cardiac Monitor:  Rate: 174 bpm  Rhythm: Irregular    EKG interpretation: (Preliminary)  11:18 PM  Undetermined rhythm at 182 bpm. Possible RVH. Inferior infarct. Anterolateral infarct. EKG read by Lizzie Clark MD at 11:18 PM     EKG interpretation: (Preliminary)  11:39 PM   Sinus tachycardia with frequent premature ventricular complexes at 117 bpm. LAD. Inferior infarct. Anterolateral infarct. No STEMI. EKG read by Lizzie Clark MD at 11:39 PM    Records Reviewed: Nursing Notes and Previous electrocardiograms    Provider Notes (Medical Decision Making):   Ddx: Respiratory distress, PNA, emphysema, COPD exacerbation, brochitis, bronchiectasis, PTX, mass, CHF, arrythmia, AAA, r/o pe and r/o dissection.      Pt is DNI, does not want chest compressions. Procedures:  Procedures    ED Course:   11:11 PM   Initial assessment performed. The patients presenting problems have been discussed, and they are in agreement with the care plan formulated and outlined with them. I have encouraged them to ask questions as they arise throughout their visit. CONSULT NOTE:   11:22 PM  Chacorta Eaton MD spoke with Cathy Sandoval MD   Specialty: Cardiology  Discussed pt's hx, disposition, and available diagnostic and imaging results. Reviewed care plans. Consulting physician agrees with plans as outlined. Will look at EKG. Written by ARI Lawsonibe, as dictated by Chacorta Eaton MD.    CONSULT NOTE:   11:45 PM  Chacorta Eaton MD spoke with Cathy Sandoval MD   Specialty: Cardiology  Discussed pt's hx, disposition, and available diagnostic and imaging results. Reviewed care plans. Consulting physician agrees with plans as outlined. States that EKG looks like A-fib with RVR. Debate whether to tx with beta blockers vs shock. Given now that HR has come down he states that it is no longer indicated to tx pt with meds. Written by ARI Lawsonibe, as dictated by Chacorta Eaton MD.    CONSULT NOTE:   12:59 Narendra Barrera MD spoke with Giselle Urban MD   Specialty: Hospitalist  Discussed pt's hx, disposition, and available diagnostic and imaging results. Reviewed care plans. Consulting physician agrees with plans as outlined. Did not recommend Lovenox at this time. Written by Shun Madison ED Scribe, as dictated by Chacorta Eaton MD.    1:36 AM  We could not ween pt off BiPAP. Diagnosis and Disposition     12:30 AM  I have spent 70 minutes of critical care time involved in lab review, consultations with specialist, family decision-making, and documentation. During this entire length of time I was immediately available to the patient. Critical Care:   The reason for providing this level of medical care for this critically ill patient was due a critical illness that impaired one or more vital organ systems such that there was a high probability of imminent or life threatening deterioration in the patients condition. This care involved high complexity decision making to assess, manipulate, and support vital system functions, to treat this degreee vital organ system failure and to prevent further life threatening deterioration of the patients condition. Critical Care Time: 70 mins    Core Measures:  For Hospitalized Patients:    1. Hospitalization Decision Time:  The decision to hospitalize the patient was made by Michelle Bukc MD at 1:45 AM on 1/14/2018    2. Aspirin: Aspirin was given    1:45 AM  Patient is being admitted to the hospital by Elon Callander, MD. The results of their tests and reasons for their admission have been discussed with them and/or available family. They convey agreement and understanding for the need to be admitted and for their admission diagnosis. CONDITIONS ON ADMISSION:  Sepsis is present at the time of admission. Deep Vein Thrombosis is not present at the time of admission. Thrombosis coronary  Possible, no pe,  present at the time of admission. Pneumonia is present at the time of admission. MRSA is not present at the time of admission. Wound infection is not present at the time of admission. Pressure Ulcer is not present at the time of admission. CLINICAL IMPRESSION:    1. Respiratory insufficiency    2. Community acquired pneumonia of left lower lobe of lung ([address]    3. Acute exacerbation of chronic obstructive pulmonary disease (COPD) ([address]    4. Pulmonary emphysema, unspecified emphysema type ([address]    5. ACS (acute coronary syndrome) ([address]    6. Acute pulmonary edema (HCC)    7. Aneurysm ([address]        PLAN: ADMIT TO ICU  _______________________________    Attestations:   This note is prepared by Marge Valdez, acting as Scribe for Michelle Buck MD.    Michelle Buck MD:  The viridiana's documentation has been prepared under my direction and personally reviewed by me in its entirety.   I confirm that the note above accurately reflects all work, treatment, procedures, and medical decision making performed by me.  _______________________________

## 2018-01-15 NOTE — H&P
History & Physical    Patient: Mira Gallegos MRN: [id number]  CSN: [id number]    YOB: 1935  Age: 81 y.o. Sex: female      DOA: 1/14/2018  Primary Care Provider:  Nader Edge MD      Assessment/Plan     Patient Active Problem List   Diagnosis Code    Acute respiratory insufficiency R06.89    COPD exacerbation (HCC) J44.1    Infiltrate noted on imaging study R93.8    Elevated brain natriuretic peptide (BNP) level R79.89    Fluid overload E87.70    Atrial fibrillation with RVR (HCC) I48.91    Lung cancer (HCC) C34.90    S/P lobectomy of lung Z90.2    Positive D dimer R79.89    SIRS (systemic inflammatory response syndrome) (HCC) R65.10    PNA (pneumonia) J18.9    Hypoxia R09.02    Acquired hypothyroidism E03.9    HTN (hypertension) I10    Acute respiratory disease J06.9    CHF exacerbation (HCC) I50.9     1. Acute Resp Distress with hypoxia on RA, multifactorial  2. A fib with rvr, resolved. Currently in NSR  3. Elevated BNP, possible chf. No echo available   4. Possible CAP  5. Elevated Trop. Likely from a fib rvr? 6. Elevated D Dimer   7. SIRS (likely #4)  8. Lung cancer s/p lobectomy; cured according to her   5. Hyperthyroidism   10. HTN  Partial Code     -admit to the icu as she is on bipap   -fluid restriction, daily weight. Lasix 40m iv. I/Os  -trend cardiac enzymes. 2d echo ordered  -cardiology consulted in ED  -IV Abx, sputum cultures. Flu swab  -due to elevated bnp/trops/tachycardia/hypoxia - will need CTA chest to rule out PE  -duonebs prn, mucinex   -supportive care   -cont other home medications at this time   -trend Cr. Update 315am   CTA findings: No PE, enlarging thoracic pseudoaneurysm, extensive emphysema with fibrous, pleural based irregular density  -Vascular consult placed  -will need pulm consult in am.     Estimated length of stay : 3-4 days     CC: sob       HPI:     Mira Gallegos is a 81 y.o. female who comes to the ed with complaints of sob.  Patient states she had been feeling sob and some URI type of symptoms for the past 5 days. She went to an Urgent care facility where she was told she may have Bronchitis and was given Prednisone and \"some Abx\". Despite of taking those atr home, she continued to feel sob to a point where she couldn't even move around with feeling sob. She also had some subjective low grade temp at home. In the ED She was noted to be in A fib with rvr and she spontaneously converted to NSR. She had to be placed on BiPAP due to resp distress. labx showed elevated bnp, trops and d dimer. Cardiology was consulted. Neb, steroids, IVF and lasix were ordered. When I saw the patient she did not tolerated off being bipap therefore it was continued. No other new complaints. Past Medical History:   Diagnosis Date    Chronic obstructive pulmonary disease ([facility]     Hypertension     Lung cancer ([facility] [address]        Past Surgical History:   Procedure Laterality Date    HX HYSTERECTOMY      HX OTHER SURGICAL      right lung lobe removal       History reviewed. No pertinent family history. Social History     Social History    Marital status:      Spouse name: N/A    Number of children: N/A    Years of education: N/A     Social History Main Topics    Smoking status: Former Smoker    Smokeless tobacco: None    Alcohol use No    Drug use: No    Sexual activity: Not Asked     Other Topics Concern    None     Social History Narrative       Prior to Admission medications    Medication Sig Start Date End Date Taking? Authorizing Provider   methIMAzole (TAPAZOLE) 5 mg tablet Take 2.5 mg by mouth. Yes Historical Provider   temazepam (RESTORIL) 30 mg capsule Take 30 mg by mouth nightly as needed for Sleep. Yes Historical Provider   promethazine (PHENERGAN) 25 mg tablet Take 25 mg by mouth as needed for Nausea. Yes Historical Provider   meperidine (DEMEROL) 50 mg tablet Take 50 mg by mouth.    Yes Historical Provider   AMLODIPINE BESYLATE (NORVASC PO) Take 5 mg by mouth. Yary Segura MD   methimazole (TAPAZOLE) 5 mg tablet Take 5 mg by mouth daily. Yary Segura MD   PARoxetine (PAXIL) 10 mg tablet Take 20 mg by mouth daily. Yary Segura MD       Allergies   Allergen Reactions    Avelox [Moxifloxacin] Rash and Swelling       Review of Systems  Gen: No chills, malaise, weight loss/gain. Heent: No headache, rhinorrhea, epistaxis, ear pain, hearing loss, sinus pain, neck pain/stiffness, sore throat. Heart: No chest pain, palpitations  Resp: No cough, hemoptysis,  GI: No nausea, vomiting, diarrhea, constipation, melena or hematochezia. : No urinary obstruction, dysuria or hematuria. Derm: No rash, new skin lesion or pruritis. Musc/skeletal: no bone or joint complains. Vasc: No edema, cyanosis or claudication. Endo: No heat/cold intolerance, no polyuria,polydipsia or polyphagia. Neuro: No unilateral weakness, numbness, tingling. No seizures. Heme: No easy bruising or bleeding. Physical Exam:     Physical Exam:  Visit Vitals    /46    Pulse (!) 105    Temp 99.7 °F (37.6 °C)    Resp 25    Ht 5' 5\" (1.651 m)    Wt 51.7 kg (114 lb)    SpO2 97%    BMI 18.97 kg/m2    O2 Flow Rate (L/min): 15 l/min O2 Device: BIPAP    Temp (24hrs), Av.7 °F (37.6 °C), Min:99.7 °F (37.6 °C), Max:99.7 °F (37.6 °C)             General:  Awake, cooperative, distress due to sob, on bipap    Head:  Normocephalic, without obvious abnormality, atraumatic. Eyes:  Conjunctivae/corneas clear, sclera anicteric, PERRL, EOMs intact. Nose: Nares normal. No drainage or sinus tenderness. Throat: Lips, mucosa, and tongue normal.    Neck: Supple, symmetrical, trachea midline, no adenopathy. Lungs:   Diffuse coarse BS with exp wheezing. Heart:  Regular rate and rhythm, S1, S2 normal, no murmur, click, rub or gallop. Abdomen: Soft, non-tender. Bowel sounds normal. No masses,  No organomegaly.    Extremities: Extremities normal, atraumatic, no cyanosis or edema. Capillary refill normal.   Pulses: 2+ and symmetric all extremities. Skin: Skin color pink/pale/mottled/flushed, turgor normal. No rashes or lesions   Neurologic: CNII-XII intact. No focal motor or sensory deficit. Labs Reviewed:      Recent Results (from the past 24 hour(s))   EKG, 12 LEAD, INITIAL    Collection Time: 01/14/18 11:18 PM   Result Value Ref Range    Ventricular Rate 182 BPM    Atrial Rate 182 BPM    QRS Duration 122 ms    Q-T Interval 274 ms    QTC Calculation (Bezet) 476 ms    Calculated R Axis -90 degrees    Calculated T Axis 61 degrees    Diagnosis       Suspect arm lead reversal, interpretation assumes no reversal  Undetermined rhythm  Possible Right ventricular hypertrophy  Inferior infarct , age undetermined  Anterolateral infarct , age undetermined  Abnormal ECG  When compared with ECG of 12-JAN-2016 12:41,  Significant changes have occurred     CBC WITH AUTOMATED DIFF    Collection Time: 01/14/18 11:30 PM   Result Value Ref Range    WBC 14.9 (H) 4.6 - 13.2 K/uL    RBC 4.06 (L) 4.20 - 5.30 M/uL    HGB 12.0 12.0 - 16.0 g/dL    HCT 37.4 35.0 - 45.0 %    MCV 92.1 74.0 - 97.0 FL    MCH 29.6 24.0 - 34.0 PG    MCHC 32.1 31.0 - 37.0 g/dL    RDW 14.9 (H) 11.6 - 14.5 %    PLATELET 920 128 - 954 K/uL    MPV 10.6 9.2 - 11.8 FL    NEUTROPHILS 82 (H) 40 - 73 %    LYMPHOCYTES 9 (L) 21 - 52 %    MONOCYTES 9 3 - 10 %    EOSINOPHILS 0 0 - 5 %    BASOPHILS 0 0 - 2 %    ABS. NEUTROPHILS 12.2 (H) 1.8 - 8.0 K/UL    ABS. LYMPHOCYTES 1.3 0.9 - 3.6 K/UL    ABS. MONOCYTES 1.3 (H) 0.05 - 1.2 K/UL    ABS. EOSINOPHILS 0.0 0.0 - 0.4 K/UL    ABS.  BASOPHILS 0.1 (H) 0.0 - 0.06 K/UL    DF AUTOMATED     METABOLIC PANEL, COMPREHENSIVE    Collection Time: 01/14/18 11:30 PM   Result Value Ref Range    Sodium 141 136 - 145 mmol/L    Potassium 3.6 3.5 - 5.5 mmol/L    Chloride 98 (L) 100 - 108 mmol/L    CO2 32 21 - 32 mmol/L    Anion gap 11 3.0 - 18 mmol/L    Glucose 128 (H) 74 - 99 mg/dL BUN 27 (H) 7.0 - 18 MG/DL    Creatinine 1.24 0.6 - 1.3 MG/DL    BUN/Creatinine ratio 22 (H) 12 - 20      GFR est AA 50 (L) >60 ml/min/1.73m2    GFR est non-AA 42 (L) >60 ml/min/1.73m2    Calcium 8.9 8.5 - 10.1 MG/DL    Bilirubin, total 1.0 0.2 - 1.0 MG/DL    ALT (SGPT) 56 13 - 56 U/L    AST (SGOT) 73 (H) 15 - 37 U/L    Alk.  phosphatase 89 45 - 117 U/L    Protein, total 7.5 6.4 - 8.2 g/dL    Albumin 3.1 (L) 3.4 - 5.0 g/dL    Globulin 4.4 (H) 2.0 - 4.0 g/dL    A-G Ratio 0.7 (L) 0.8 - 1.7     MAGNESIUM    Collection Time: 01/14/18 11:30 PM   Result Value Ref Range    Magnesium 1.7 1.6 - 2.6 mg/dL   CARDIAC PANEL,(CK, CKMB & TROPONIN)    Collection Time: 01/14/18 11:30 PM   Result Value Ref Range     26 - 192 U/L    CK - MB 2.2 <3.6 ng/ml    CK-MB Index 1.9 0.0 - 4.0 %    Troponin-I, Qt. 0.45 (H) 0.00 - 0.06 NG/ML   NT-PRO BNP    Collection Time: 01/14/18 11:30 PM   Result Value Ref Range    NT pro-BNP 85941 (H) 0 - 1800 PG/ML   D DIMER    Collection Time: 01/14/18 11:30 PM   Result Value Ref Range    D DIMER 12.29 (H) <0.46 ug/ml(FEU)   POC TROPONIN-I    Collection Time: 01/14/18 11:31 PM   Result Value Ref Range    Troponin-I (POC) 0.24 (HH) 0.00 - 0.08 ng/mL   EKG, 12 LEAD, SUBSEQUENT    Collection Time: 01/14/18 11:39 PM   Result Value Ref Range    Ventricular Rate 117 BPM    Atrial Rate 117 BPM    P-R Interval 166 ms    QRS Duration 114 ms    Q-T Interval 358 ms    QTC Calculation (Bezet) 499 ms    Calculated P Axis 87 degrees    Calculated R Axis -78 degrees    Calculated T Axis 89 degrees    Diagnosis       Sinus tachycardia with frequent premature ventricular complexes  Left axis deviation  Inferior infarct (cited on or before 14-JAN-2018)  Anterolateral infarct (cited on or before 14-JAN-2018)  Abnormal ECG  When compared with ECG of 14-JAN-2018 23:18,  Previous ECG has undetermined rhythm, needs review  Questionable change in initial forces of Inferior leads  T wave inversion now evident in Lateral leads     POC G3    Collection Time: 01/14/18 11:49 PM   Result Value Ref Range    Device: Non rebreather      Flow rate (POC) 15 L/M    FIO2 (POC) 90 %    pH (POC) 7.374 7.35 - 7.45      pCO2 (POC) 55.1 (H) 35.0 - 45.0 MMHG    pO2 (POC) 118 (H) 80 - 100 MMHG    HCO3 (POC) 32.1 (H) 22 - 26 MMOL/L    sO2 (POC) 98 (H) 92 - 97 %    Base excess (POC) 7 mmol/L    Allens test (POC) YES      Total resp. rate 25      Site RIGHT RADIAL      Specimen type (POC) ARTERIAL      Performed by Torres Isidro    [facility]    Collection Time: 01/14/18 11:57 PM   Result Value Ref Range    CO2, POC 31 (H) 19 - 24 MMOL/L    Glucose,  (H) 74 - 106 MG/DL    BUN, POC 27 (H) 7 - 18 MG/DL    Creatinine, POC 1.1 0.6 - 1.3 MG/DL    GFRAA, POC 58 (L) >60 ml/min/1.73m2    GFRNA, POC 48 (L) >60 ml/min/1.73m2    Sodium,  136 - 145 MMOL/L    Potassium, POC 3.6 3.5 - 5.5 MMOL/L    Calcium, ionized (POC) 1.07 (L) 1.12 - 1.32 MMOL/L    Chloride, POC 96 (L) 100 - 108 MMOL/L    Anion gap, POC 19 10 - 20      Hematocrit, POC 58 (HH) 36 - 49 %    Hemoglobin, POC 19.7 (H) 12 - 16 G/DL       Procedures/imaging: see electronic medical records for all procedures/Xrays and details which were not copied into this note but were reviewed prior to creation of Plan    50 minutes of critical care time spent in the direct evaluation and treatment of this high risk patient. The reason for providing this level of medical care for this critically ill patient was due a critical illness that impaired one or more vital organ systems such that there was a high probability of imminent or life threatening deterioration in the patients condition. This care involved high complexity decision making to assess, manipulate, and support vital system functions, to treat this degreee vital organ system failure and to prevent further life threatening deterioration of the patients condition.       CC: Christin Burgess MD

## 2018-01-15 NOTE — CONSULTS
[id number] [facility]    Teresita Connolly  MR#: [id number]  : 1935  ACCOUNT #: [de-identified]   DATE OF SERVICE: 01/15/2018    INDICATION FOR CONSULTATION:  Wide complex tachycardia, left bundle branch block, elevated troponin, elevated BNP. HISTORY OF PRESENT ILLNESS:  I was called by Dr. Gnia Farmer, ER physician. The patient is an 28-year-old pleasant female who came with respiratory distress with known history of COPD. Her EKG showed a tachycardia with a rate of more than 160 and I reviewed the EKG and patient was in a left bundle with atrial fibrillation with RVR, and then after a few minutes, the patient converted spontaneously into normal rhythm on BiPAP. Patient denied any chest pain. Patient does not have any recent cardiac workup done. In the past, more than seven years ago, the patient had some cardiac stress testing and echocardiogram, not available in the computer at this point. Patient quit smoking a long time ago. The patient's BNP is elevated, also has a history of hyperthyroidism as well. Patient denied any chest pain, pleuritic chest pain or trauma to the chest.    PAST MEDICAL HISTORY:  1. COPD. 2.  Lung cancer. 3.  Hypertension. PAST SURGICAL HISTORY:  Significant for hysterectomy and thoracic surgery in the past.    SOCIAL HISTORY:  Quit smoking a long time ago. Denied any drug or alcohol abuse. ALLERGIES:  THE PATIENT IS ALLERGIC TO AVELOX. HOME MEDICATIONS:  Include methimazole, restoril, promethazine, Demerol, amlodipine, paroxetine. REVIEW OF SYSTEMS  A 10-point review of systems completed, positive pertinent findings discussed in the history of present illness. The rest of the systems are negative. PHYSICAL EXAMINATION:    GENERAL:  At the time of consultation, patient is comfortable, not in any distress, not using any accessory muscles of respiration. The patient is eating her lunch.   VITAL SIGNS:  Temperature is 97.7, heart rate is 103 with sinus rhythm, left bundle with frequent PACs and PAC couplets. Respiration rate is 24, blood pressure is 141/78, pulse oximetry is 97%. HEENT:  Head is atraumatic, normocephalic. NECK:  Supple, no JVD, no carotid bruit. HEART:  S1, S2 audible. LUNGS:  Bilateral air entry positive. No added sounds. ABDOMEN:  Soft, nontender. Bowel sounds audible. EXTREMITIES:  No edema. Pulses are palpable. NEUROLOGIC:  No focal motor or sensory deficit. DERMATOLOGIC:  No skin rash. MUSCULOSKELETAL:  No obvious joint deformity. LABORATORY DATA:  EKG, left bundle with AFib with RVR, then converted into normal rhythm. BNP is 18,203. Troponin is 0.90, still trending with a normal initial CK-MB of 2.2, now it is 4.6. Sodium 144, potassium 3.5. CT and x-ray also reviewed. No echocardiographic report. ASSESSMENT AND PLAN:  1. Respiratory distress, multifactorial.  2.  Atrial fibrillation with rapid ventricular response, converted into normal rhythm. 3.  Left bundle branch block. 4.  Elevated troponin. 5.  Vasculopathy with aortic aneurysm. 6.  Hypertension. 7.  Chronic obstructive pulmonary disease. RECOMMENDATION:  At this point, we will get the echocardiogram.  I will change DVT prophylaxis, heparin to therapeutic Lovenox and will get the echocardiogram.  Depending upon the echocardiogram, then we will risk stratify the patient. Because of AFib and also as patient has history of hyperthyroidism, her CHADS vascular score it is already above 4, she will be a candidate for anticoagulation. She has no history of any blood transfusion, very remote history of a GI ulcer in the past without further treatment. Patient have multiple co morbidities and does not aggressive invasive treatment. Thank you for allowing me to participate in the management of this pleasant patient.       Hossein Mahmood MD MA / BOOKER  D: 01/15/2018 11:26     T: 01/15/2018 11:55  JOB #: [id number]

## 2018-01-15 NOTE — DIABETES MGMT
GLYCEMIC CONTROL & NUTRITION:    - Discussed in rounds, no known h/o DM, current A1C 5.0% (wnl)  - noted steroids on board, POCT + Humalog corrective coverage orders in place, recommend continue until steroids d/c      Recent Glucose Results:   Lab Results   Component Value Date/Time     (H) 01/15/2018 09:30 AM     (H) 01/14/2018 11:30 PM    GLUCPOC 123 (H) 01/14/2018 11:57 PM     Lab Results   Component Value Date/Time    Hemoglobin A1c 5.0 01/15/2018 09:50 AM           M. Redmond Kayser, MPH, RD, CDE

## 2018-01-15 NOTE — PROGRESS NOTES
conducted an initial consultation and Spiritual Assessment for Palmira, who is a 81 y.o.,female. According to the patients chart Gnosticism Affiliation is: [address] visit with patient. Daughter has gone to work (at [employer]) but help patient with her needs. Patient is a member of [employer]. The reason the Patient came to the hospital is:   Patient Active Problem List    Diagnosis Date Noted    Acute respiratory insufficiency 01/15/2018    COPD exacerbation ([facility] 01/15/2018    Infiltrate noted on imaging study 01/15/2018    Elevated brain natriuretic peptide (BNP) level 01/15/2018    Fluid overload 01/15/2018    Atrial fibrillation with RVR ([facility] 01/15/2018    Lung cancer ([facility] 01/15/2018    S/P lobectomy of lung 01/15/2018    Positive D dimer 01/15/2018    SIRS (systemic inflammatory response syndrome) ([facility] 01/15/2018    PNA (pneumonia) 01/15/2018    Hypoxia 01/15/2018    Acquired hypothyroidism 01/15/2018    HTN (hypertension) 01/15/2018    Acute respiratory disease 01/15/2018    CHF exacerbation ([address] 01/15/2018        The  provided the following Interventions:  Initiated a relationship of care and support. Explored issues of jani, belief, spirituality and Protestant/ritual needs while hospitalized. Listened empathically. Provided information about Spiritual Care Services. Offered prayer and assurance of continued prayers on patients behalf. Chart reviewed. The following outcomes were achieved:  Patient shared limited information about her medical narrative, spiritual journey and beliefs.  confirmed Patient's Gnosticism Affiliation. Patient processed feelings about current hospitalization. Patient expressed gratitude for Spiritual Care visit. Patient was reviewed in ICU Interdisciplinary Rounds.     Assessment:  There are no significant spiritual or Protestant issues which require further intervention at this time. Patient does not have any Anabaptist or cultural needs that will affect patients preferences in health care. Plan:  Chaplains will continue to follow and will provide pastoral care as needed or requested.  recommends bedside caregivers page  on duty if patient shows signs of acute spiritual or emotional distress. [id number] Carlene Salgado M.Div.   Board Certified   [phone] - Office

## 2018-01-15 NOTE — PROGRESS NOTES
Pharmacy Dosing Services: Vancomycin    Consult for Vancomycin Dosing by Pharmacy by Dr. Surekha Reyes provided for this 81y.o. year old female , for indication of pneumonia (CAP). Day of Therapy 1    Ht Readings from Last 1 Encounters:   01/14/18 165.1 cm (65\")        Wt Readings from Last 1 Encounters:   01/14/18 51.7 kg (114 lb)      Other Current Antibiotics Azithromycin 500 mg IV q24h  Zosyn 2.25 grams IV q6h   Significant Cultures pending   Serum Creatinine Lab Results   Component Value Date/Time    Creatinine 1.07 01/15/2018 09:30 AM    Creatinine, POC 1.1 01/14/2018 11:57 PM      Creatinine Clearance Estimated Creatinine Clearance: 33.7 mL/min (based on Cr of 1.07). BUN Lab Results   Component Value Date/Time    BUN 32 01/15/2018 09:30 AM    BUN, POC 27 01/14/2018 11:57 PM      WBC Lab Results   Component Value Date/Time    WBC 14.9 01/14/2018 11:30 PM      H/H Lab Results   Component Value Date/Time    HGB 12.0 01/14/2018 11:30 PM      Platelets Lab Results   Component Value Date/Time    PLATELET 626 91/82/3174 11:30 PM      Temp 98 °F (36.7 °C)     Start Vancomycin therapy, with loading dose of 1000 mg IV once, administered 1/15/18 at 10:16. Follow with maintenance dose of Vancomycin 750 mg IV every 24 hours, scheduled for 1/16/18 at 10:00. Dose calculated to approximate a therapeutic trough of 15 - 20 mcg/mL. Pharmacy to follow daily and will make changes to dose and/or frequency based on clinical status.   Pharmacist Arcadio Kehr, [address]

## 2018-01-15 NOTE — PROGRESS NOTES
Interdisciplinary Rounds (IDR) / REHAB:    Occupational Therapy: just off BiPAP; likely appropriate tomorrow per MD/Farrah. Physical Therapy: just off BiPAP; likely appropriate tomorrow per MD/Farrah. Speech Therapy: just off BiPAP; likely appropriate tomorrow per MD/Farrah.     Amada Marx, OTR/L

## 2018-01-15 NOTE — PROGRESS NOTES
RESPIRATORY NOTE;  Pt taken off BIPAP per MD, ABG done, regular nasal cannula initiated 4-6 LPM as tolerated per SPO2, BIPAP to be worn at night.

## 2018-01-15 NOTE — PROGRESS NOTES
Report and transfer of trust completed with Tiara Montenegro RN. Patient on bipap at this time tolerated well. Dyspnea with exertion noted. Course breath sounds with decreased bases. Reviewed SBAR< MAR, results and plan. 0800 assessment as noted. Patient in good spirits. 0900 bipap removed and changed to NC. Tolerating well. Sips of water taken and am meds tolerated well. Reviewed patient status at bedside with Dr Vito Paredes and orders noted. 1000 remains on nasal cannula. Using bedpan, tolerated fairly well. In no distress at this time. Daughter called in for update using patient code. 1200 remains in bed. Hr elevated, but due for cardizem. Assisted to use bedpan, some dyspnea noted. 1245 reports HA, tylenol ordered and given per patient request.  1400 patient is sleeping. 1500 reviewed results and noted positive flu A. Placed on droplet precautions. Picking at lunch, states she is not very hungry. 1600 discussed flu results with Dr Vito Paredes and potassium level. Order for Potassium po.  1700 potassium given and  Patient repositioned for dinner tray. Tolerating well. Daughter in to visit. Updated on flu status. 1800 assisted with bedpan. No problems or concerns. 1900 report and transfe of [facility] completed with Tiara Montenegro RN. Reviewed SBAR, results mar and plan.

## 2018-01-15 NOTE — CONSULTS
Surgery Consult      Patient: Nicole Pleitez MRN: [id number]  CSN: [id number]      YOB: 1935    Age: 81 y.o. Sex: female      DOA: 1/14/2018       HPI:     Nicole Pleitez is a 81 y.o. female with a history of COPD, lung CA s/p resection, and hypertension who came to the ED on 01/14/18 complaining of worsening shortness of breath after being treated as an outpatient for respiratory illness. During workup for her shortness of breath, it was discovered that Mrs. 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 Mrs. Escoto denies chest pain and is a non-smoker. Past Medical History:   Diagnosis Date    [facility] obstructive pulmonary disease ([facility] [address]     Hypertension     Lung cancer ([facility] [address]        Past Surgical History:   Procedure Laterality Date    HX HYSTERECTOMY      HX OTHER SURGICAL      right lung lobe removal       History reviewed. No pertinent family history. Social History     Social History    Marital status:      Spouse name: N/A    Number of children: N/A    Years of education: N/A     Social History Main Topics    Smoking status: Former Smoker    Smokeless tobacco: None    Alcohol use No    Drug use: No    Sexual activity: Not Asked     Other Topics Concern    None     Social History Narrative       Prior to Admission medications    Medication Sig Start Date End Date Taking? Authorizing Provider   temazepam (RESTORIL) 30 mg capsule Take 30 mg by mouth nightly as needed for Sleep. Yes Historical Provider   promethazine (PHENERGAN) 25 mg tablet Take 25 mg by mouth as needed for Nausea. Yes Historical Provider   meperidine (DEMEROL) 50 mg tablet Take 50 mg by mouth. Yes Historical Provider   AMLODIPINE BESYLATE (NORVASC PO) Take 5 mg by mouth. Yary Segura MD   methimazole (TAPAZOLE) 5 mg tablet Take 5 mg by mouth daily. Yary Segura MD   PARoxetine (PAXIL) 10 mg tablet Take 20 mg by mouth daily.     Yary Segura MD       Allergies Allergen Reactions    Avelox [Moxifloxacin] Rash and Swelling       Physical Exam:      Visit Vitals    /65    Pulse 89    Temp 98 °F (36.7 °C)    Resp 21    Ht 5' 5\" (1.651 m)    Wt 114 lb (51.7 kg)    SpO2 92%    BMI 18.97 kg/m2       GENERAL: alert, cooperative, mild distress, appears stated age, EYE: negative, LUNG: wheezes throughout, HEART: irregularly irregular rhythm, ABDOMEN: soft, non-tender. Bowel sounds normal. EXTREMITIES:  extremities normal, atraumatic, no cyanosis or edema, SKIN: no rash or abnormalities    ROS:  Review of Systems - General ROS: positive for fatigue  Respiratory ROS: positive for - cough, shortness of breath, tachypnea and wheezing  Cardiovascular ROS: positive for - dyspnea on exertion  Gastrointestinal ROS: no abdominal pain, change in bowel habits  Dermatological ROS: negative    Unless otherwise mentioned in the HPI. Data Review:    CBC:   Lab Results   Component Value Date/Time    WBC 14.9 01/14/2018 11:30 PM    RBC 4.06 01/14/2018 11:30 PM    HGB 12.0 01/14/2018 11:30 PM    HCT 37.4 01/14/2018 11:30 PM    PLATELET 596 48/10/1609 11:30 PM      BMP:   Lab Results   Component Value Date/Time    Glucose 135 01/15/2018 09:30 AM    Sodium 144 01/15/2018 09:30 AM    Potassium 3.5 01/15/2018 09:30 AM    Chloride 101 01/15/2018 09:30 AM    CO2 36 01/15/2018 09:30 AM    BUN 32 01/15/2018 09:30 AM    Creatinine 1.07 01/15/2018 09:30 AM    Calcium 8.7 01/15/2018 09:30 AM       Assessment/Plan     Mrs. Jones ascending aneurysm would need to be managed and repaired by a CT surgeon. I would suggest referral to CT surgery as an outpatient. She states she has a CT surgeon at [facility] who did her lung resection several years ago. Mrs. Escoto's aortic arch aneurysm would require extensive surgery for repair. We will discuss the management options with her and her family so they can decide how they would like to proceed with this.     Principal Problem:    Acute respiratory insufficiency (1/15/2018)    Active Problems:    COPD exacerbation ([address] (1/15/2018)      Infiltrate noted on imaging study (1/15/2018)      Elevated brain natriuretic peptide (BNP) level (1/15/2018)      Fluid overload (1/15/2018)      Atrial fibrillation with RVR ([address] (1/15/2018)      Lung cancer ([address] (1/15/2018)      S/P lobectomy of lung (1/15/2018)      Positive D dimer (1/15/2018)      SIRS (systemic inflammatory response syndrome) ([address] (1/15/2018)      PNA (pneumonia) (1/15/2018)      Hypoxia (1/15/2018)      Acquired hypothyroidism (1/15/2018)      HTN (hypertension) (1/15/2018)      Acute respiratory disease (1/15/2018)      CHF exacerbation ([address] (1/15/2018)      Leo Ware NP  January 15, 2018

## 2018-01-15 NOTE — ED NOTES
TRANSFER - OUT REPORT:    Verbal report given to Rozina Willis RN(name) on [employer]  being transferred to ICU(unit) for routine progression of care       Report consisted of patients Situation, Background, Assessment and   Recommendations(SBAR). Information from the following report(s) SBAR, ED Summary, Procedure Summary, MAR, Recent Results and Cardiac Rhythm A fib was reviewed with the receiving nurse. Lines:       Opportunity for questions and clarification was provided.       Patient transported with:   Monitor  Registered Nurse  Tech-respiratory

## 2018-01-15 NOTE — CONSULTS
Dorota Ortega M.D  [address] [facility] [address]  Phone [phone] Fax [phone]  Pulmonary Critical Care & Sleep Medicine       Name: Emmy Lazo MRN: [id number]   : 1935 Hospital: [facility] [place]   Date: 1/15/2018        Critical Care Initial Patient Consult    Requesting MD:                                                  Reason for CC Consult:    IMPRESSION:   Patient Active Problem List   Diagnosis Code    Acute respiratory insufficiency R06.89    COPD exacerbation ([facility] [address] J44.1    Infiltrate noted on imaging study R93.8    Elevated brain natriuretic peptide (BNP) level R79.89    Fluid overload E87.70    Atrial fibrillation with RVR (HCC) I48.91    Lung cancer ([facility]) C34.90    S/P lobectomy of lung Z90.2    Positive D dimer R79.89    SIRS (systemic inflammatory response syndrome) ([facility]) R65.10    PNA (pneumonia) J18.9    Hypoxia R09.02    Acquired hypothyroidism E03.9    HTN (hypertension) I10    Acute respiratory disease J06.9    CHF exacerbation ([facility]) I50.9 ·   Acute respiratory failure on chronic respiratory failure <2litero oxygen at home>  · COPD and possible COPD exacerbation  · H/o Hyperthyroidism on methimazole  · Came with AFIB RVR  · Elevated troponin and elevated BNP <Cardio Consulted from ER>  · Abnormal CT chest  · Received lasix, and contrast worry on contrast induced nephropathy. PLAN:   -- Get PT PTT TSH  -- Not sure she is in fluid overload I think elevated BNP could be due to pulmonary HTN and COPD  -- Steroids <IV> Add budosenide and atrovent   -- Echo  -- RLL pleural abnormality with subcarinal lymphadenoapthy is concerning for malignancy in back ground of severe emphysema  -- Add broad spectrum antibiotics < Was on antibiotics before she came>  -- Get Flu test   [facility]:Not sure on fluid overload, Get Echo, Follow cardiology recommendation.  But over diuresing might result in worsening renal function. Hyperthyroid and afib? ? Continue methimazole. . Get TSH. Change norvasc to cardizem and monitor. Follow cardio consult. RS: Doing ok. Looks like in COPD exacerbation. Add solumedrol IV. Add Atrovent and stop albuterol due to afib. Add budesonide. RLL mass looks very concerning for malignancy. Will need to treat for pneumonia and rpt imaging in 3-6 month and if continue to have abnormality EBUS vs CT guided biopsy is warranted. Take off bipap and recheck abg. ID:Pan culture. Add zosyn and zithromax. Sputum culture  ENDO:Get TSH continue methimazole for now get SSI  GI:PPI   RENAL:Worry on renal function. Get BMP if cr is going up will start 1/2 ns as received contrast.  CNS:No acute issue for now  HEMATOLOGY:HB and PLT stable  MUSCULOSKELETAL:No acute issue  PAIN AND SEDATION: No acute issue  ADVANCE DIRECTIVE:Full code  FAMILY DISCUSSION:Spoke with patient at length  Vascular: Get Vascular consult. For abnormal CT  Quality Care: PPI, DVT prophylaxis, HOB elevated, Infection control all reviewed and addressed. Events and notes from last 24 hours reviewed. Care plan discussed with nursing CC TIME:55 min    · Labs and images personally seen and available reports reviewed. · All current medicines are reviewed and doses and prescription adjusted. Subjective/History: Juana Loza is a 81 y.o. female who comes to the ed with complaints of sob. Patient states she had been feeling sob and some URI type of symptoms for the past 5 days. She went to an Urgent care facility where she was told she may have Bronchitis and was given Prednisone and \"some Abx\". Despite of taking those atr home, she continued to feel sob to a point where she couldn't even move around with feeling sob. She also had some subjective low grade temp at home. In the ED She was noted to be in A fib with rvr and she spontaneously converted to NSR. She had to be placed on BiPAP due to resp distress.  labx showed elevated bnp, trops and d dimer. Cardiology was consulted. Neb, steroids, IVF and lasix were ordered. Admitted to ICU   Managed with BIPAP last night  Chart reviewed  H/o Lung cancer 10 years back never followed with pulmonary  Recently been treated for exacerbation and was on steroids and antibiotics  Doing ok in am  No fever but sputum and greenish flame        Past Medical History:   Diagnosis Date    [facility] pulmonary disease ([facility] [address]     Hypertension     Lung cancer ([facility] [address]       Past Surgical History:   Procedure Laterality Date    HX HYSTERECTOMY      HX OTHER SURGICAL      right lung lobe removal      Prior to Admission medications    Medication Sig Start Date End Date Taking? Authorizing Provider   methIMAzole (TAPAZOLE) 5 mg tablet Take 2.5 mg by mouth. Yes Historical Provider   temazepam (RESTORIL) 30 mg capsule Take 30 mg by mouth nightly as needed for Sleep. Yes Historical Provider   promethazine (PHENERGAN) 25 mg tablet Take 25 mg by mouth as needed for Nausea. Yes Historical Provider   meperidine (DEMEROL) 50 mg tablet Take 50 mg by mouth. Yes Historical Provider   AMLODIPINE BESYLATE (NORVASC PO) Take 5 mg by mouth. Yary Segura MD   methimazole (TAPAZOLE) 5 mg tablet Take 5 mg by mouth daily. Yary Segura MD   PARoxetine (PAXIL) 10 mg tablet Take 20 mg by mouth daily.     Yary Segura MD     Current Facility-Administered Medications   Medication Dose Route Frequency    PARoxetine (PAXIL) tablet 20 mg  20 mg Oral DAILY    methIMAzole (TAPAZOLE) tablet 2.5 mg  2.5 mg Oral DAILY    heparin (porcine) injection 5,000 Units  5,000 Units SubCUTAneous Q8H    guaiFENesin ER (MUCINEX) tablet 600 mg  600 mg Oral Q12H    budesonide (PULMICORT) 500 mcg/2 ml nebulizer suspension  500 mcg Nebulization BID RT    ipratropium (ATROVENT) 0.02 % nebulizer solution 0.5 mg  0.5 mg Nebulization Q4H RT    methylPREDNISolone (PF) (SOLU-MEDROL) injection 40 mg  40 mg IntraVENous Q6H    piperacillin-tazobactam (ZOSYN) 2.25 g in 0.9% sodium chloride (MBP/ADV) 50 mL ADV  2.25 g IntraVENous Q6H    [START ON 2018] azithromycin (ZITHROMAX) 500 mg in 0.9% sodium chloride 250 mL IVPB  500 mg IntraVENous Q24H    insulin lispro (HUMALOG) injection   SubCUTAneous AC&HS    dilTIAZem (CARDIZEM) IR tablet 30 mg  30 mg Oral TIDAC    magnesium sulfate 2 g/50 ml IVPB (premix or compounded)  2 g IntraVENous NOW     Allergies   Allergen Reactions    Avelox [Moxifloxacin] Rash and Swelling      Social History   Substance Use Topics    Smoking status: Former Smoker    Smokeless tobacco: Not on file    Alcohol use No      History reviewed. No pertinent family history. Objective:   Vital Signs:    Visit Vitals    /66    Pulse 79    Temp 97.7 °F (36.5 °C)    Resp 23    Ht 5' 5\" (1.651 m)    Wt 51.7 kg (114 lb)    SpO2 97%    BMI 18.97 kg/m2       O2 Device: BIPAP   O2 Flow Rate (L/min): 15 l/min   Temp (24hrs), Av.1 °F (36.7 °C), Min:96.6 °F (35.9 °C), Max:99.7 °F (37.6 °C)       Intake/Output:   Last shift:         Last 3 shifts:    No intake or output data in the 24 hours ending 01/15/18 0919  Hemodynamics:   . @MAP     . @CVP       Ventilator Settings:  Mode Rate Tidal Volume Pressure FiO2 PEEP            40 %       Peak airway pressure:      Minute ventilation: 12.1 l/min      ARDS network Guidelines: Lung protective strategy and Pl pressure goals <30    Review of Systems:  HEENT: No epistaxis, no nasal drainage, no difficulty in swallowing, no redness in eyes  Respiratory: as above  Cardiovascular: no chest pain, no palpitations, no chronic leg edema, no syncope  Gastrointestinal: no abd pain, no vomiting, no diarrhea, no bleeding symptoms  Genitourinary: No urinary symptoms or hematuria  Integument/breast: No ulcers or rashes  Musculoskeletal:Neg  Neurological: No focal weakness, no seizures, no headaches  Behvioral/Psych: No anxiety, no depression  Constitutional: No fever, no chills, no weight loss, no night sweats     Objective:    General: comfortable, no acute distress  HEENT: pupils reactive, sclera anicteric, EOM intact  Neck: No adenopathy or thyroid swelling, no lymphadenopathy or JVD, supple  CVS: S1S2 no murmurs  RS: Mod AE bilaterally, Bilateral extensive wheezing and RLL rales  Abd: soft, non tender, no hepatosplenomegaly  Neuro: non focal, awake, alert  Extrm: no leg edema, clubbing or cyanosis  Lymph node: No obvious palpable lymph node appreciated. Skin: no rash  CBC w/Diff Recent Labs      01/14/18   2330   WBC  14.9*   RBC  4.06*   HGB  12.0   HCT  37.4   PLT  233   GRANS  82*   LYMPH  9*   EOS  0        Chemistry Recent Labs      01/14/18   2330   GLU  128*   NA  141   K  3.6   CL  98*   CO2  32   BUN  27*   CREA  1.24   CA  8.9   MG  1.7   AGAP  11   BUCR  22*   AP  89   TP  7.5   ALB  3.1*   GLOB  4.4*   AGRAT  0.7*        Lactic Acid No results found for: LAC  No results for input(s): LAC in the last 72 hours. Micro  No results for input(s): SDES, CULT in the last 72 hours. No results for input(s): CULT in the last 72 hours. ABG Recent Labs      01/14/18   2349   PHI  7.374   PCO2I  55.1*   PO2I  118*   HCO3I  32.1*   FIO2I  90        Liver Enzymes Protein, total   Date Value Ref Range Status   01/14/2018 7.5 6.4 - 8.2 g/dL Final     Albumin   Date Value Ref Range Status   01/14/2018 3.1 (L) 3.4 - 5.0 g/dL Final     Globulin   Date Value Ref Range Status   01/14/2018 4.4 (H) 2.0 - 4.0 g/dL Final     A-G Ratio   Date Value Ref Range Status   01/14/2018 0.7 (L) 0.8 - 1.7   Final     AST (SGOT)   Date Value Ref Range Status   01/14/2018 73 (H) 15 - 37 U/L Final     Alk.  phosphatase   Date Value Ref Range Status   01/14/2018 89 45 - 117 U/L Final     Recent Labs      01/14/18   2330   TP  7.5   ALB  3.1*   GLOB  4.4*   AGRAT  0.7*   SGOT  73*   AP  89        Cardiac Enzymes Lab Results   Component Value Date/Time     01/15/2018 04:00 AM     01/14/2018 11:30 PM    CKMB <1.0 01/15/2018 04:00 AM    CKMB 2.2 01/14/2018 11:30 PM    CKND1  01/15/2018 04:00 AM     CALCULATION NOT PERFORMED WHEN RESULT IS BELOW LINEAR LIMIT    CKND1 1.9 01/14/2018 11:30 PM    TROIQ 0.09 (H) 01/15/2018 04:00 AM    TROIQ 0.45 (H) 01/14/2018 11:30 PM    TNIPOC 0.24 (HH) 01/14/2018 11:31 PM        BNP No results found for: BNP, BNPP, XBNPT     Coagulation No results for input(s): PTP, INR, APTT in the last 72 hours. No lab exists for component: INREXT      Thyroid  Lab Results   Component Value Date/Time    TSH 3.49 08/02/2017 04:20 PM          Lipid Panel No results found for: CHOL, CHOLPOCT, CHOLX, CHLST, CHOLV, 924355, HDL, LDL, LDLC, DLDLP, 161398, VLDLC, VLDL, TGLX, TRIGL, TRIGP, TGLPOCT, CHHD, CHHDX       Urinalysis Lab Results   Component Value Date/Time    Color YELLOW 08/02/2017 04:20 PM    Appearance CLEAR 08/02/2017 04:20 PM    Specific gravity 1.008 08/02/2017 04:20 PM    pH (UA) 8.0 08/02/2017 04:20 PM    Protein 30 08/02/2017 04:20 PM    Glucose NEGATIVE  08/02/2017 04:20 PM    Ketone NEGATIVE  08/02/2017 04:20 PM    Bilirubin NEGATIVE  08/02/2017 04:20 PM    Urobilinogen 0.2 08/02/2017 04:20 PM    Nitrites NEGATIVE  08/02/2017 04:20 PM    Leukocyte Esterase NEGATIVE  08/02/2017 04:20 PM    Epithelial cells FEW 08/02/2017 04:20 PM    Bacteria FEW 08/02/2017 04:20 PM    WBC 0 to 3 08/02/2017 04:20 PM    RBC 0 to 3 08/02/2017 04:20 PM        XR (Most Recent). CXR reviewed by me and compared with previous CXR   Results from Hospital Encounter encounter on 01/14/18   XR CHEST PORT   Narrative Portable chest xray     Reason for exam: Chest pain    Images: 1    Comparison:  Chest x-ray 1/12/2016 and. Chest CT 11/10/2010    Findings: The cardiomediastinal contours are stable. The aorta is calcified and  ectatic. Postoperative changes are again noted from right lung resection. There clips at  the right hilum. There is volume loss with elevated right hemidiaphragm.  There  is some scarring or atelectasis at the right lung base. Mild emphysematous  changes are present. There is now some interstitial opacity present at the left costophrenic angle, a  little denser than before. Atelectasis or developing infiltrate could be  present. The lungs elsewhere are stable. No pneumothorax. No large effusions. Impression IMPRESSION:    Mild interstitial density is now seen at the left costophrenic angle, perhaps  atelectasis versus developing infiltrate. The lungs elsewhere are unchanged. No pneumothorax or CHF. CT (Most Recent)   Results from Hospital Encounter encounter on 01/14/18   CTA CHEST W OR W WO CONT   Narrative CTA CHEST WITH IV CONTRAST    INDICATION: Dyspnea, elevated d-dimer, rule out pulmonary embolus    TECHNIQUE: Volumetric scanning with IV contrast. Thin overlapping coronal and  sagittal MIP reconstructions. One or more dose reduction techniques were used on  this CT: automated exposure control, adjustment of the mAs and/or kVp according  to patient's size, and iterative reconstruction techniques. The specific  techniques utilized on this CT exam have been documented in the patient's  electronic medical record. COMPARISON: Chest x-ray of the same day and chest CT 11/10/2010    FINDINGS:    General comment regarding exam quality:  Overall quality: Satisfactory. Adequacy of bolus: Satisfactory. Adequacy of suspended respiration: Satisfactory. Any other factors affecting exam quality: None. Heart and vasculature: Ascending abdominal aorta now measures about 4.7 x 4.6  cm. Previously it measured 4.3 cm At the arch, the caliber is about 3.8 cm. 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 This has calcifications peripherally and does  not contain IV contrast. Ulcerative plaque and calcifications of the descending  thoracic aorta which now shows caliber of 3.9 x 4.5 cm. Previously it measured  3.9 cm. Admixed contrast due to timing of the bolus is present at the descending  aorta. This area is not maximally evaluated. No periaortic hematoma or definite  dissection. No evidence of pulmonary embolus    Remaining mediastinum: Tracheomegaly. Subcarinal masslike focus measures up to  3.4 x 1.9 cm. Smaller paratracheal nodes. Lungs and pleural spaces: Postop changes at the right upper lobe from resection. Extensive centrilobular emphysema with bulla present. No pneumothorax. Fibrotic  banding and thickening of intralobular septae. At the right lower lobe there is  a soft tissue focus abutting the pleural surface which measures about 2.3 x 2.5  cm, inflammatory, scarring or infiltrate. Mass also is possible. At the left  lower lobe confluent reticular densities, more numerous than before present with  some honeycombing as well as soft tissue components. Chest wall: No acute or aggressive bony abnormalities. Right lateral mid rib  defects, probably from the prior surgery. Visualized upper abdomen: Heterogeneous appearance of the gallbladder. There  could be sludge or gallstones. Consider ultrasound if the patient is symptomatic  in this area. Impression IMPRESSION:    No evidence of pulmonary embolus. 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 Consider vascular consultation. Extensive emphysematous changes with fibrous worsening fibrosis and  honeycombing. Pleural-based irregular densities at right lower lobe could be inflammatory or  infectious versus neoplasm. Suspect there is sludge or debris within the gallbladder. Subcarinal lymphadenopathy. I discussed the critical findings with Dr. Sharon Wright at 3:00 AM.           EKG No results found for this or any previous visit. ECHO No results found for this or any previous visit. PFT No flowsheet data found.      Other ASA reactivity:   Pre-albumin:   Ionized Calcium:   NH4:   T3, FT4:  Cortisol:  Urine Osm:  Urine Lytes:   HbA1c:          Imaging:  I have personally reviewed the patients radiographs and have reviewed the reports:     Best practice :  Fluids started at 30 ml/kg with aim to keep CVP 8 or above  Lactic acid ordered- initial and repeat Q6hrs if elevated till normalized. Cultures drawn. Antibiotic administered within 1hr-ICU  And 3hrs ED  Pressors aim MAP >65mmHg  Steriods  Glycemic control  Mech. Ventilated patients- aim to keep peak plateau pressure 02-98ME H2O. Sress ulcer prophylaxis  DVT prophylaxis    Vent bundle, Brower bundle and central line bundle followed. Leona Jarrett M.D.   Pulmonary Critical Care & Sleep Medicine

## 2018-01-15 NOTE — ED NOTES
Pt breathing improved in bipap IPAP 12/EPAP 8 Fi02 40%. Oxygen sat 97%. No pain at this time. Continue frequent monitoring.

## 2018-01-15 NOTE — ED TRIAGE NOTES
Pt reports to ED c/c SOB. Pt arrives to ED on NRB, unable to speak in complete sentences, using neck muscles excessively. Pt  when placed on monitor. Pt reports being dx with bronchitis on Friday currently taking antibiotics. MD called to bedside. Sepsis Screening completed    ( xx )Patient meets SIRS criteria. (  )Patient does not meet SIRS criteria.       SIRS Criteria is achieved when two or more of the following are present   Temperature < 96.8°F (36°C) or > 100.9°F (38.3°C)   Heart Rate > 90 beats per minute   Respiratory Rate > 20 breaths per minute   WBC count > 12,000 or <4,000 or > 10% bands

## 2018-01-16 PROBLEM — R91.8 RIGHT LOWER LOBE LUNG MASS: Status: ACTIVE | Noted: 2018-01-01

## 2018-01-16 PROBLEM — J43.1 PANLOBULAR EMPHYSEMA (HCC): Status: ACTIVE | Noted: 2018-01-01

## 2018-01-16 PROBLEM — Z85.118 HISTORY OF LUNG CANCER: Status: ACTIVE | Noted: 2018-01-01

## 2018-01-16 NOTE — ROUTINE PROCESS
Bedside, Verbal and Written shift change report given to CHRISTOFER Henry (oncoming nurse) by Jeanette Suárez (offgoing nurse). Report included the following information SBAR, Kardex, Intake/Output and Recent Results. Assumed care of pt at this time. Pt is aox4, following commands,denies pain, remains monitored, Bipap at this time. 0900  RT at bedside Bipap removed, pt placed on 3L NC, tolerating well. Sitting up eating dinner. 1100  No change, pt VSS. 1400  Palliative team at bedside for family conference. 1700  Pt status unchanged, remains monitored, 3L NC. VSS  1915  Bedside, Verbal and Written shift change report given to [address] (oncoming nurse) by CHRISTOFER Henry (offgoing nurse). Report included the following information SBAR, Kardex, Intake/Output and Recent Results.

## 2018-01-16 NOTE — PROGRESS NOTES
Sebastian Patino M.D  [address] [facility] [address] [address]  Phone [phone] Fax [phone]  Pulmonary Critical Care & Sleep Medicine       Name: Crow Grewal MRN: [id number]   : 1935 Hospital: [facility] [place]   Date: 2018        ICU Follow Up    Requesting MD:                                                  Reason for CC Consult:    IMPRESSION:   Patient Active Problem List   Diagnosis Code    Acute respiratory insufficiency R06.89    COPD exacerbation ([address] J44.1    Infiltrate noted on imaging study R93.8    Elevated brain natriuretic peptide (BNP) level R79.89    Fluid overload E87.70    Atrial fibrillation with RVR (HCC) I48.91    Lung cancer (HCC) C34.90    S/P lobectomy of lung Z90.2    Positive D dimer R79.89    SIRS (systemic inflammatory response syndrome) ([facility]) R65.10    PNA (pneumonia) J18.9    Hypoxia R09.02    Acquired hypothyroidism E03.9    HTN (hypertension) I10    Acute respiratory disease J06.9    CHF exacerbation ([facility]) I50.9 ·   Acute respiratory failure on chronic respiratory failure <2litero oxygen at home>  · COPD and possible COPD exacerbation  · H/o Hyperthyroidism on methimazole  · Came with AFIB RVR  · Elevated troponin and elevated BNP <Cardio Consulted from ER>  · Abnormal CT chest  · Received lasix, and contrast worry on contrast induced nephropathy. PLAN:   -- Some rales on exam will give dose of lasix as CR has remained stable   -- Steroids <IV change to 30 bid> On budosenide and atrovent   -- Echo to R/o pulmonary HTN and COPD  -- RLL pleural abnormality with subcarinal lymphadenoapthy is concerning for malignancy in back ground of severe emphysema. . Discussed with patient seems like palliative also had discussion does not wish aggressive measures  -- On Vanco zosyn zithro. . Pending sputum culture will hold vanco as negative blood culture and plt dropped  -- Flu positive on oseltamivir   CVS:Not sure on fluid overload, Get Echo, Follow cardiology recommendation. On cardizem and doing well. On lovenox full dose monitor plt as drop noted if continue to drop we need to stop lovenox   RS: Doing ok. Looks like in COPD exacerbation. Wheezing improved decrease solumedrol  On  Atrovent and stop albuterol due to afib. ON  budesonide. RLL mass looks very concerning for malignancy. Will need to treat for pneumonia and rpt imaging in 3-6 month and if continue to have abnormality EBUS vs CT guided biopsy is warranted. PATIENT DOES NOT WISH ANY AGGRESSIVE WORK UP OR BIOSPY. Doing well. Back to baseline oxygen requirement. ID:Pan culture. ON zosyn and zithromax. Sputum culture  ENDO:TSH is 0.10 get Free T4 and adjust methimazole accordingly  GI:PPI   RENAL:CR and K is better  CNS: Drowsy but arousable and appropriate monitor clincially   HEMATOLOGY:HBstable but drop in PLT noted  MUSCULOSKELETAL:No acute issue  PAIN AND SEDATION: No acute issue  ADVANCE DIRECTIVE:Partial code palliative is going to follow up. FAMILY DISCUSSION:Spoke with patient at length  Vascular: consult appreicated suggested surgeries needed but patient does not wish to persue further. Quality Care: PPI, DVT prophylaxis, HOB elevated, Infection control all reviewed and addressed. Events and notes from last 24 hours reviewed. Care plan discussed with nursing CC TIME:45 min    · Labs and images personally seen and available reports reviewed. · All current medicines are reviewed and doses and prescription adjusted. Subjective/History: Nicole Pleitez is a 81 y.o. female who comes to the ed with complaints of sob. Patient states she had been feeling sob and some URI type of symptoms for the past 5 days. She went to an Urgent care facility where she was told she may have Bronchitis and was given Prednisone and \"some Abx\".  Despite of taking those atr home, she continued to feel sob to a point where she couldn't even move around with feeling sob. She also had some subjective low grade temp at home. In the ED She was noted to be in A fib with rvr and she spontaneously converted to NSR. She had to be placed on BiPAP due to resp distress. labx showed elevated bnp, trops and d dimer. Cardiology was consulted. Neb, steroids, IVF and lasix were ordered.      Admitted to ICU   Managed with BIPAP last night  Chart reviewed  H/o Lung cancer 10 years back never followed with pulmonary  Recently been treated for exacerbation and was on steroids and antibiotics  Doing ok in am  No fever but sputum and greenish flame    1/16/18  Influenza a positive  No overnight event  Cardiology and Vascular consult appreciated  Palliative also following  Family does not wish any aggressive care or biopsy of lung or vascular surgery  No overnight event  Drop in PLT to 149  TSH 0.10       Current Facility-Administered Medications   Medication Dose Route Frequency    PARoxetine (PAXIL) tablet 20 mg  20 mg Oral DAILY    methIMAzole (TAPAZOLE) tablet 2.5 mg  2.5 mg Oral DAILY    guaiFENesin ER (MUCINEX) tablet 600 mg  600 mg Oral Q12H    budesonide (PULMICORT) 500 mcg/2 ml nebulizer suspension  500 mcg Nebulization BID RT    ipratropium (ATROVENT) 0.02 % nebulizer solution 0.5 mg  0.5 mg Nebulization Q4H RT    methylPREDNISolone (PF) (SOLU-MEDROL) injection 40 mg  40 mg IntraVENous Q6H    piperacillin-tazobactam (ZOSYN) 2.25 g in 0.9% sodium chloride (MBP/ADV) 50 mL ADV  2.25 g IntraVENous Q6H    azithromycin (ZITHROMAX) 500 mg in 0.9% sodium chloride 250 mL IVPB  500 mg IntraVENous Q24H    insulin lispro (HUMALOG) injection   SubCUTAneous AC&HS    dilTIAZem (CARDIZEM) IR tablet 30 mg  30 mg Oral TIDAC    pantoprazole (PROTONIX) tablet 40 mg  40 mg Oral ACB    Vancomycin - Pharmacy to Dose  1 Each Other Rx Dosing/Monitoring    enoxaparin (LOVENOX) injection 50 mg  1 mg/kg SubCUTAneous Q12H    vancomycin (VANCOCIN) 750 mg in dextrose 5% 250 mL IVPB 750 mg IntraVENous Q24H    oseltamivir (TAMIFLU) capsule 30 mg  30 mg Oral Q12H    nystatin (MYCOSTATIN) 100,000 unit/mL oral suspension 500,000 Units  500,000 Units Oral QID         Objective:   Vital Signs:    Visit Vitals    /71    Pulse 81    Temp 97.7 °F (36.5 °C)    Resp 20    Ht 5' 5\" (1.651 m)    Wt 51.7 kg (114 lb)    SpO2 98%    BMI 18.97 kg/m2       O2 Device: BIPAP   O2 Flow Rate (L/min): 3 l/min   Temp (24hrs), Av.8 °F (31.6 °C), Min:37.4 °F (3 °C), Max:98.1 °F (36.7 °C)       Intake/Output:   Last shift:         Last 3 shifts:  1901 -  0700  In: 300 [P.O.:250; I.V.:50]  Out: 500 [Urine:500]    Intake/Output Summary (Last 24 hours) at 18 1020  Last data filed at 01/15/18 1300   Gross per 24 hour   Intake              250 ml   Output              500 ml   Net             -250 ml     Hemodynamics:   . @MAP     . @CVP           Review of Systems:  HEENT: No epistaxis, no nasal drainage, no difficulty in swallowing, no redness in eyes  Respiratory: as above  Cardiovascular: no chest pain, no palpitations, no chronic leg edema, no syncope  Gastrointestinal: no abd pain, no vomiting, no diarrhea, no bleeding symptoms  Genitourinary: No urinary symptoms or hematuria  Integument/breast: No ulcers or rashes      Objective:    General: comfortable, no acute distress  HEENT: pupils reactive, sclera anicteric, EOM intact  Neck: No adenopathy or thyroid swelling, no lymphadenopathy or JVD, supple  CVS: S1S2 no murmurs  RS: Mod AE bilaterally, Bilateral extensive wheezing and RLL rales  Abd: soft, non tender, no hepatosplenomegaly  Neuro: non focal, awake, alert  Extrm: no leg edema, clubbing or cyanosis  Lymph node: No obvious palpable lymph node appreciated.   Skin: no rash  CBC w/Diff Recent Labs      18   0546  18   2330   WBC  9.6  14.9*   RBC  3.89*  4.06*   HGB  11.1*  12.0   HCT  35.8  37.4   PLT  149  233   GRANS   --   82*   LYMPH   --   9*   EOS   --   0 Chemistry Recent Labs      01/16/18   0546  01/15/18   0930  01/14/18   2330   GLU  147*  135*  128*   NA  142  144  141   K  4.0  3.5  3.6   CL  104  101  98*   CO2  36*  36*  32   BUN  34*  32*  27*   CREA  0.93  1.07  1.24   CA  8.7  8.7  8.9   MG  2.1  2.3  1.7   AGAP  2*  7  11   BUCR  37*  30*  22*   AP  80   --   89   TP  6.8   --   7.5   ALB  2.7*   --   3.1*   GLOB  4.1*   --   4.4*   AGRAT  0.7*   --   0.7*        Lactic Acid Lactic acid   Date Value Ref Range Status   01/15/2018 2.2 (HH) 0.4 - 2.0 MMOL/L Final     Comment:     CALLED TO AND CORRECTLY REPEATED BY:  RICARDO AGUILAR RN ICU ON 1/15/18 AT 1112 TO MLK       Recent Labs      01/15/18   0945   LAC  2.2*        Micro  Recent Labs      01/15/18   0945   CULT  NO GROWTH AFTER 20 HOURS  NO GROWTH AFTER 20 HOURS     Recent Labs      01/15/18   0945   CULT  NO GROWTH AFTER 20 HOURS  NO GROWTH AFTER 20 HOURS        ABG Recent Labs      01/15/18   0935  01/14/18   2349   PHI  7.392  7.374   PCO2I  54.4*  55.1*   PO2I  53*  118*   HCO3I  33.1*  32.1*   FIO2I   --   90        Liver Enzymes Protein, total   Date Value Ref Range Status   01/16/2018 6.8 6.4 - 8.2 g/dL Final     Albumin   Date Value Ref Range Status   01/16/2018 2.7 (L) 3.4 - 5.0 g/dL Final     Globulin   Date Value Ref Range Status   01/16/2018 4.1 (H) 2.0 - 4.0 g/dL Final     A-G Ratio   Date Value Ref Range Status   01/16/2018 0.7 (L) 0.8 - 1.7   Final     AST (SGOT)   Date Value Ref Range Status   01/16/2018 59 (H) 15 - 37 U/L Final     Alk.  phosphatase   Date Value Ref Range Status   01/16/2018 80 45 - 117 U/L Final     Recent Labs      01/16/18   0546  01/14/18   2330   TP  6.8  7.5   ALB  2.7*  3.1*   GLOB  4.1*  4.4*   AGRAT  0.7*  0.7*   SGOT  59*  73*   AP  80  89        Cardiac Enzymes No results found for: CPK, CK, CKMMB, CKMB, RCK3, CKMBT, CKNDX, CKND1, MARCIA, TROPT, TROIQ, JUSTYN, TROPT, TNIPOC, BNP, BNPP     BNP No results found for: BNP, BNPP, XBNPT     Coagulation Recent Labs 01/15/18   0930   PTP  14.9   INR  1.2   APTT  36.2         Thyroid  Lab Results   Component Value Date/Time    TSH 0.10 01/15/2018 09:30 AM          Lipid Panel No results found for: CHOL, CHOLPOCT, CHOLX, CHLST, CHOLV, 829892, HDL, LDL, LDLC, DLDLP, 255978, VLDLC, VLDL, TGLX, TRIGL, TRIGP, TGLPOCT, CHHD, CHHDX       Urinalysis Lab Results   Component Value Date/Time    Color YELLOW 08/02/2017 04:20 PM    Appearance CLEAR 08/02/2017 04:20 PM    Specific gravity 1.008 08/02/2017 04:20 PM    pH (UA) 8.0 08/02/2017 04:20 PM    Protein 30 08/02/2017 04:20 PM    Glucose NEGATIVE  08/02/2017 04:20 PM    Ketone NEGATIVE  08/02/2017 04:20 PM    Bilirubin NEGATIVE  08/02/2017 04:20 PM    Urobilinogen 0.2 08/02/2017 04:20 PM    Nitrites NEGATIVE  08/02/2017 04:20 PM    Leukocyte Esterase NEGATIVE  08/02/2017 04:20 PM    Epithelial cells FEW 08/02/2017 04:20 PM    Bacteria FEW 08/02/2017 04:20 PM    WBC 0 to 3 08/02/2017 04:20 PM    RBC 0 to 3 08/02/2017 04:20 PM        XR (Most Recent). CXR reviewed by me and compared with previous CXR   Results from Hospital Encounter encounter on 01/14/18   XR CHEST PORT   Narrative CHEST AP PORTABLE    Indication: Hypoxia, shortness of breath. Comparison: X-ray 01/14/2018. Findings: Stable borderline cardiomegaly without evidence for vascular  congestion. Stable mediastinal silhouette with aortic atherosclerosis. Lungs  appear hyperinflated. There is right-sided volume loss with elevation of the  right hemidiaphragm. Peripheral right basilar streaky and patchy alveolar  opacity and reticular and hazy opacity at the left lung base without appreciable  change. Upper lung zones appear clear with anastomotic staples at the right  apex. No evidence for pleural effusion or pneumothorax. No acute osseous  abnormality identified. Impression IMPRESSION:    No significant interval change.  Bibasal streaky and patchy opacity suspicious  for scarring and/or superimposed infiltrate, stable. CT (Most Recent)   Results from Hospital Encounter encounter on 01/14/18   CTA CHEST W OR W WO CONT   Narrative CTA CHEST WITH IV CONTRAST    INDICATION: Dyspnea, elevated d-dimer, rule out pulmonary embolus    TECHNIQUE: Volumetric scanning with IV contrast. Thin overlapping coronal and  sagittal MIP reconstructions. One or more dose reduction techniques were used on  this CT: automated exposure control, adjustment of the mAs and/or kVp according  to patient's size, and iterative reconstruction techniques. The specific  techniques utilized on this CT exam have been documented in the patient's  electronic medical record. COMPARISON: Chest x-ray of the same day and chest CT 11/10/2010    FINDINGS:    General comment regarding exam quality:  Overall quality: Satisfactory. Adequacy of bolus: Satisfactory. Adequacy of suspended respiration: Satisfactory. Any other factors affecting exam quality: None. Heart and vasculature: Ascending abdominal aorta now measures about 4.7 x 4.6  cm. Previously it measured 4.3 cm At the arch, the caliber is about 3.8 cm. 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 This has calcifications peripherally and does  not contain IV contrast. Ulcerative plaque and calcifications of the descending  thoracic aorta which now shows caliber of 3.9 x 4.5 cm. Previously it measured  3.9 cm. Admixed contrast due to timing of the bolus is present at the descending  aorta. This area is not maximally evaluated. No periaortic hematoma or definite  dissection. No evidence of pulmonary embolus    Remaining mediastinum: Tracheomegaly. Subcarinal masslike focus measures up to  3.4 x 1.9 cm. Smaller paratracheal nodes. Lungs and pleural spaces: Postop changes at the right upper lobe from resection. Extensive centrilobular emphysema with bulla present. No pneumothorax.  Fibrotic  banding and thickening of intralobular septae. At the right lower lobe there is  a soft tissue focus abutting the pleural surface which measures about 2.3 x 2.5  cm, inflammatory, scarring or infiltrate. Mass also is possible. At the left  lower lobe confluent reticular densities, more numerous than before present with  some honeycombing as well as soft tissue components. Chest wall: No acute or aggressive bony abnormalities. Right lateral mid rib  defects, probably from the prior surgery. Visualized upper abdomen: Heterogeneous appearance of the gallbladder. There  could be sludge or gallstones. Consider ultrasound if the patient is symptomatic  in this area. Impression IMPRESSION:    No evidence of pulmonary embolus. 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 Consider vascular consultation. Extensive emphysematous changes with fibrous worsening fibrosis and  honeycombing. Pleural-based irregular densities at right lower lobe could be inflammatory or  infectious versus neoplasm. Suspect there is sludge or debris within the gallbladder. Subcarinal lymphadenopathy. I discussed the critical findings with Dr. Mary Vicente at 3:00 AM.           EKG No results found for this or any previous visit. ECHO No results found for this or any previous visit. PFT No flowsheet data found. Other ASA reactivity:   Pre-albumin:   Ionized Calcium:   NH4:   T3, FT4:  Cortisol:  Urine Osm:  Urine Lytes:   HbA1c:          Imaging:  I have personally reviewed the patients radiographs and have reviewed the reports:     Best practice :  Fluids started at 30 ml/kg with aim to keep CVP 8 or above  Lactic acid ordered- initial and repeat Q6hrs if elevated till normalized. Cultures drawn. Antibiotic administered within 1hr-ICU  And 3hrs ED  Pressors aim MAP >65mmHg  Steriods  Glycemic control  [facility]. Ventilated patients- aim to keep peak plateau pressure 42-34FZ H2O.   Sress ulcer prophylaxis  DVT prophylaxis    Vent bundle, Brower bundle and central line bundle followed. Mackenzie Osuna M.D.   Pulmonary Critical Care & Sleep Medicine

## 2018-01-16 NOTE — CONSULTS
[facility]: [phone]  [facility]: [address] [place] [address] Way: [phone]    Patient Name: Kristyn Israel  YOB: 1935    Date of Initial Consult: 1/16/18  Reason for Consult: care decisions  Requesting Provider: Dr. Tyson Doyle   Primary Care Physician: Ilda Devine MD      SUMMARY:   Kristyn Israel is a 81y.o. year old with a past history of O2 dependent COPD admitted with acute exacerbation with respiratory failure requiring BIPAP. Tested + for influenza, on tamiflu. Slowly improving, used BIPAP last night. Has developed progressive weakness, wt loss and increasing SOB since having to move in with her daughter 10 months ago. Can't do much but go bed-chair. History of RUL lung cancer resected 2008, had pneumothorax and thorocostomy at that time. Daughter notes increase forgetfullness and some confusion over the last few months, worse since she she became acutely ill. Daughter works but up to now has been able to leave her home during the day. Independent in ADLs. Has an AMD at home, but did not bring it in.  CT chest demonstrates advanced emphesema and a RLL pleural based mass with hilar adenopathy. PALLIATIVE DIAGNOSES:     Patient Active Problem List   Diagnosis Code    Acute respiratory insufficiency R06.89    COPD exacerbation ([facility]) J44.1    Infiltrate noted on imaging study R93.8    Elevated brain natriuretic peptide (BNP) level R79.89    Fluid overload E87.70    Atrial fibrillation with RVR ([facility]) I48.91    History of lung cancer- RUL lobectomy  c 2007 Z85. 80    S/P lobectomy of lung Z90.2    Positive D dimer R79.89    SIRS (systemic inflammatory response syndrome) ([facility]) R65.10    PNA (pneumonia) J18.9    Hypoxia R09.02    Acquired hypothyroidism E03.9    HTN (hypertension) I10    Acute respiratory disease J06.9    CHF exacerbation ([facility]) I50.9    Panlobular emphysema ([facility]) J43.1    Right lower lobe lung mass- pleural based with hilar adenopathy R91.8     1. PLAN:   1. Met with patient and her daughter with review of current clinical condition. Described options to pursue diagnosis of probable lung cancer and potential treatment. She does not wish to undergo any sort of workup or consider treatment because of her already rapidly worsening quality of life. She prefers to focus on comfort and quality of life withhout additional hospitalizations. Recommended hospice enrollment and described services Her daughter will arrange additional care to allow her to be cared for at home with hospice. DDNR executed. Medications to manage dyspnea ordered. Would consider switching to oral abx and steroids to complete course and expedite home with hospice. Could take long acting diltiazem. Given goals of care long term anticoagulation not appropriate. Questions answered. 2. Initial consult note routed to primary continuity provider  3. Communicated plan of care with: Palliative IDT       GOALS OF CARE:     GOALS OF CARE:  Patient/Health Care Proxy Stated Goals: Comfort      TREATMENT PREFERENCES:   Code Status: DNR    Advance Care Planning:  Advance Care Planning 1/16/2018   Patient's Healthcare Decision Maker is: Verbal statement (Legal Next of Kin remains as decision maker)   Primary Decision Maker Name Litzy Romo   Primary Decision Maker Phone Number [phone], work [phone]. Primary Decision Maker Relationship to Patient Adult child   Confirm Advance Directive Yes, not on file   Does the patient have other document types Do Not Resuscitate       Medical Interventions: Comfort measures   Other Instructions:   Artificially Administered Nutrition: No feeding tube     Other:    As far as possible, the palliative care team has discussed with patient / health care proxy about goals of care / treatment preferences for patient.            HISTORY:     History obtained from: patient    CHIEF COMPLAINT: see summary    HPI/SUBJECTIVE:    The patient is:   [x] Verbal and participatory  [] Non-participatory due to:        Clinical Pain Assessment (nonverbal scale for nonverbal patients): Clinical Pain Assessment  Severity: 1          Duration: for how long has pt been experiencing pain (e.g., 2 days, 1 month, years)  Frequency: how often pain is an issue (e.g., several times per day, once every few days, constant)     FUNCTIONAL ASSESSMENT:     Palliative Performance Scale (PPS):  PPS: 50       PSYCHOSOCIAL/SPIRITUAL SCREENING:     Advance Care Planning:  Advance Care Planning 1/16/2018   Patient's Healthcare Decision Maker is: Verbal statement (Legal Next of Kin remains as decision maker)   Primary Decision Maker Name Flako Silveira   Primary Decision Maker Phone Number [phone], work [phone]. Primary Decision Maker Relationship to Patient Adult child   Confirm Advance Directive Yes, not on file   Does the patient have other document types Do Not Resuscitate        Any spiritual / Amish concerns:  [] Yes /  [] No    Caregiver Burnout:  [] Yes /  [] No /  [] No Caregiver Present      Anticipatory grief assessment:   [] Normal  / [] Maladaptive       ESAS Anxiety: Anxiety: 0    ESAS Depression: Depression: 0        REVIEW OF SYSTEMS:     Positive and pertinent negative findings in ROS are noted above in HPI. The following systems were [] reviewed / [] unable to be reviewed as noted in HPI  Other findings are noted below. Systems: constitutional, ears/nose/mouth/throat, respiratory, gastrointestinal, genitourinary, musculoskeletal, integumentary, neurologic, psychiatric, endocrine. Positive findings noted below.   Modified ESAS Completed by: provider   Fatigue: 7 Drowsiness: 0   Depression: 0 Pain: 1   Anxiety: 0 Nausea: 0   Anorexia: 6 Dyspnea: 7     Constipation: Yes     Stool Occurrence(s): 1        PHYSICAL EXAM:     Wt Readings from Last 3 Encounters:   01/14/18 51.7 kg (114 lb)   01/12/16 52.2 kg (115 lb)     Blood pressure 121/71, pulse 81, temperature 98.5 °F (36.9 °C), resp. rate 20, height 5' 5\" (1.651 m), weight 51.7 kg (114 lb), SpO2 94 %.   Pain:  Pain Scale 1: Numeric (0 - 10)  Pain Intensity 1: 0                 Last bowel movement:     Constitutional: frail, ill appearing  Eyes: pupils equal, anicteric  ENMT: no nasal discharge, moist mucous membranes  Cardiovascular: regular rhythm, distal pulses intact  Respiratory: breathing not labored, symmetric  Gastrointestinal: soft non-tender, +bowel sounds  Musculoskeletal: no deformity, no tenderness to palpation  Skin: warm, dry  Neurologic: following commands, moving all extremities  Psychiatric: full affect, no hallucinations  Other:       HISTORY:     Principal Problem:    Acute respiratory insufficiency (1/15/2018)    Active Problems:    COPD exacerbation ([address] (1/15/2018)      Infiltrate noted on imaging study (1/15/2018)      Elevated brain natriuretic peptide (BNP) level (1/15/2018)      Fluid overload (1/15/2018)      Atrial fibrillation with RVR ([address] (1/15/2018)      History of lung cancer- RUL lobectomy  c 2007 (1/15/2018)      S/P lobectomy of lung (1/15/2018)      Positive D dimer (1/15/2018)      SIRS (systemic inflammatory response syndrome) ([facility]) (1/15/2018)      PNA (pneumonia) (1/15/2018)      Hypoxia (1/15/2018)      Acquired hypothyroidism (1/15/2018)      HTN (hypertension) (1/15/2018)      Acute respiratory disease (1/15/2018)      CHF exacerbation ([facility]) (1/15/2018)      Panlobular emphysema ([address] (1/16/2018)      Right lower lobe lung mass- pleural based with hilar adenopathy (1/16/2018)      Past Medical History:   Diagnosis Date    Chronic obstructive pulmonary disease ([address]     History of lung cancer- RUL lobectomy  c 2007 1/15/2018    Hypertension     Lung cancer ([address]     Panlobular emphysema ([address] 1/16/2018    Right lower lobe lung mass- pleural based with hilar adenopathy 1/16/2018      Past Surgical History:   Procedure Laterality Date    HX HYSTERECTOMY      HX OTHER SURGICAL      right lung lobe removal      History reviewed. No pertinent family history. History reviewed, no pertinent family history.   Social History   Substance Use Topics    Smoking status: Former Smoker    Smokeless tobacco: Not on file    Alcohol use No     Allergies   Allergen Reactions    Avelox [Moxifloxacin] Rash and Swelling      Current Facility-Administered Medications   Medication Dose Route Frequency    methylPREDNISolone (PF) (SOLU-MEDROL) injection 30 mg  30 mg IntraVENous Q12H    PARoxetine (PAXIL) tablet 20 mg  20 mg Oral DAILY    methIMAzole (TAPAZOLE) tablet 2.5 mg  2.5 mg Oral DAILY    temazepam (RESTORIL) capsule 30 mg  30 mg Oral QHS PRN    guaiFENesin ER (MUCINEX) tablet 600 mg  600 mg Oral Q12H    budesonide (PULMICORT) 500 mcg/2 ml nebulizer suspension  500 mcg Nebulization BID RT    ipratropium (ATROVENT) 0.02 % nebulizer solution 0.5 mg  0.5 mg Nebulization Q4H RT    piperacillin-tazobactam (ZOSYN) 2.25 g in 0.9% sodium chloride (MBP/ADV) 50 mL ADV  2.25 g IntraVENous Q6H    azithromycin (ZITHROMAX) 500 mg in 0.9% sodium chloride 250 mL IVPB  500 mg IntraVENous Q24H    insulin lispro (HUMALOG) injection   SubCUTAneous AC&HS    glucose chewable tablet 16 g  4 Tab Oral PRN    glucagon (GLUCAGEN) injection 1 mg  1 mg IntraMUSCular PRN    dextrose (D50W) injection syrg 12.5-25 g  25-50 mL IntraVENous PRN    dilTIAZem (CARDIZEM) IR tablet 30 mg  30 mg Oral TIDAC    pantoprazole (PROTONIX) tablet 40 mg  40 mg Oral ACB    enoxaparin (LOVENOX) injection 50 mg  1 mg/kg SubCUTAneous Q12H    acetaminophen (TYLENOL) tablet 650 mg  650 mg Oral Q6H PRN    oseltamivir (TAMIFLU) capsule 30 mg  30 mg Oral Q12H    nystatin (MYCOSTATIN) 100,000 unit/mL oral suspension 500,000 Units  500,000 Units Oral QID        LAB AND IMAGING FINDINGS:     Lab Results   Component Value Date/Time    WBC 9.6 01/16/2018 05:46 AM    HGB 11.1 01/16/2018 05:46 AM    PLATELET 534 14/24/4872 05:46 AM     Lab Results   Component Value Date/Time    Sodium 142 01/16/2018 05:46 AM    Potassium 4.0 01/16/2018 05:46 AM    Chloride 104 01/16/2018 05:46 AM    CO2 36 01/16/2018 05:46 AM    BUN 34 01/16/2018 05:46 AM    Creatinine 0.93 01/16/2018 05:46 AM    Calcium 8.7 01/16/2018 05:46 AM    Magnesium 2.1 01/16/2018 05:46 AM      Lab Results   Component Value Date/Time    AST (SGOT) 59 01/16/2018 05:46 AM    Alk.  phosphatase 80 01/16/2018 05:46 AM    Protein, total 6.8 01/16/2018 05:46 AM    Albumin 2.7 01/16/2018 05:46 AM    Globulin 4.1 01/16/2018 05:46 AM     Lab Results   Component Value Date/Time    INR 1.2 01/15/2018 09:30 AM    Prothrombin time 14.9 01/15/2018 09:30 AM    aPTT 36.2 01/15/2018 09:30 AM      No results found for: IRON, FE, TIBC, IBCT, PSAT, FERR   No results found for: PH, PCO2, PO2  No components found for: [place]   Lab Results   Component Value Date/Time     01/15/2018 09:30 AM    CK - MB 4.6 01/15/2018 09:30 AM              Total time: 70 min  Counseling / coordination time, spent as noted above: 53  > 50% counseling / coordination        Gregoria Lange MD  Palliative Medicine

## 2018-01-16 NOTE — PROGRESS NOTES
Noted order for Hospice services,cm met daughter Ethan Canas walking out of patient's room, daughter stated she believes she going to use same hospice agency used for her father needs to go home to find old paper work with agency's name, cm provided contact information along with Hospice listings if needed.

## 2018-01-16 NOTE — PROGRESS NOTES
Patient placed on BIPAP with only a small amount of convincing needed. Tolerated well, vital signs are stable with no increased WOB.

## 2018-01-16 NOTE — PROGRESS NOTES
RESPIRATORY NOTE:  Pt ready to come off BIPAP this morning, comfortable on nasal cannula,  will monitor.

## 2018-01-16 NOTE — DIABETES MGMT
GLYCEMIC CONTROL & NUTRITION:    - Discussed in rounds, no known h/o DM, current A1C 5.0% (wnl)  - noted steroids on board, POCT + Humalog corrective coverage orders in place, recommend continue until steroids d/c      Recent Glucose Results:   Lab Results   Component Value Date/Time     (H) 01/16/2018 05:46 AM    GLUCPOC 123 (H) 01/16/2018 11:14 AM    GLUCPOC 133 (H) 01/16/2018 05:39 AM    GLUCPOC 126 (H) 01/15/2018 09:38 PM     Lab Results   Component Value Date/Time    Hemoglobin A1c 5.0 01/15/2018 09:50 AM         BINU Lloyd, MPH, RD, CDE

## 2018-01-16 NOTE — PROGRESS NOTES
Cardiology Progress Note        Patient: Micki Ramon        Sex: female          DOA: 1/14/2018  YOB: 1935      Age:  81 y.o.        LOS:  LOS: 1 day   Assessment/Plan     Principal Problem:    Acute respiratory insufficiency (1/15/2018)    Active Problems:    COPD exacerbation ([address] (1/15/2018)      Infiltrate noted on imaging study (1/15/2018)      Elevated brain natriuretic peptide (BNP) level (1/15/2018)      Fluid overload (1/15/2018)      Atrial fibrillation with RVR ([address] (1/15/2018)      History of lung cancer- RUL lobectomy  c 2007 (1/15/2018)      S/P lobectomy of lung (1/15/2018)      Positive D dimer (1/15/2018)      SIRS (systemic inflammatory response syndrome) ([address] (1/15/2018)      PNA (pneumonia) (1/15/2018)      Hypoxia (1/15/2018)      Acquired hypothyroidism (1/15/2018)      HTN (hypertension) (1/15/2018)      Acute respiratory disease (1/15/2018)      CHF exacerbation ([address] (1/15/2018)        Plan:  EF 35% to 405  Afib and now SR   LBBB  Cardiomyopathy  Discussed with patient about ischemia work and long term anticoagulation. She does not stress test/cath  She want to think about whitney term anticoagulation. Subjective:    cc:  Denied CP      REVIEW OF SYSTEMS: NO CP, SOB, N,V,D, F,C  Objective:      Visit Vitals    /71    Pulse 81    Temp 98.5 °F (36.9 °C)    Resp 20    Ht 5' 5\" (1.651 m)    Wt 51.7 kg (114 lb)    SpO2 94%    BMI 18.97 kg/m2     Body mass index is 18.97 kg/(m^2). Physical Exam:  General Appearance: Comfortable, not using accessory muscles of respiration. HEENT: SOCRATES. HEAD: Atraumatic  NECK: No JVD,   LUNGS: Clear bilaterally. HEART: S1+S2     ABD: Non-tender, BS Audible    EXT: No edema, and no cysnosis. VASCULAR EXAM: Pulses are intact. PSYCHIATRIC EXAM: Mood is appropriate. MUSCULOSKELETAL: Grossly no joint deformity.   NEUROLOGICAL: No motor and sensory deficit, Cranial nerves II-XII intact. Medication:  Current Facility-Administered Medications   Medication Dose Route Frequency    methylPREDNISolone (PF) (SOLU-MEDROL) injection 30 mg  30 mg IntraVENous Q12H    PARoxetine (PAXIL) tablet 20 mg  20 mg Oral DAILY    methIMAzole (TAPAZOLE) tablet 2.5 mg  2.5 mg Oral DAILY    temazepam (RESTORIL) capsule 30 mg  30 mg Oral QHS PRN    guaiFENesin ER (MUCINEX) tablet 600 mg  600 mg Oral Q12H    budesonide (PULMICORT) 500 mcg/2 ml nebulizer suspension  500 mcg Nebulization BID RT    ipratropium (ATROVENT) 0.02 % nebulizer solution 0.5 mg  0.5 mg Nebulization Q4H RT    piperacillin-tazobactam (ZOSYN) 2.25 g in 0.9% sodium chloride (MBP/ADV) 50 mL ADV  2.25 g IntraVENous Q6H    azithromycin (ZITHROMAX) 500 mg in 0.9% sodium chloride 250 mL IVPB  500 mg IntraVENous Q24H    insulin lispro (HUMALOG) injection   SubCUTAneous AC&HS    glucose chewable tablet 16 g  4 Tab Oral PRN    glucagon (GLUCAGEN) injection 1 mg  1 mg IntraMUSCular PRN    dextrose (D50W) injection syrg 12.5-25 g  25-50 mL IntraVENous PRN    dilTIAZem (CARDIZEM) IR tablet 30 mg  30 mg Oral TIDAC    pantoprazole (PROTONIX) tablet 40 mg  40 mg Oral ACB    enoxaparin (LOVENOX) injection 50 mg  1 mg/kg SubCUTAneous Q12H    acetaminophen (TYLENOL) tablet 650 mg  650 mg Oral Q6H PRN    oseltamivir (TAMIFLU) capsule 30 mg  30 mg Oral Q12H    nystatin (MYCOSTATIN) 100,000 unit/mL oral suspension 500,000 Units  500,000 Units Oral QID               Lab/Data Reviewed: All lab results for the last 24 hours reviewed.      Recent Labs      01/16/18   0546  01/14/18   2330   WBC  9.6  14.9*   HGB  11.1*  12.0   HCT  35.8  37.4   PLT  149  233     Recent Labs      01/16/18   0546  01/15/18   0930  01/14/18   2330   NA  142  144  141   K  4.0  3.5  3.6   CL  104  101  98*   CO2  36*  36*  32   GLU  147*  135*  128*   BUN  34*  32*  27*   CREA  0.93  1.07  1.24   CA  8.7  8.7  8.9       Signed By: Dominik Brian MD     January 16, 2018

## 2018-01-16 NOTE — PROGRESS NOTES
Hospitalist Progress Note    Patient: Go Hwang MRN: [id number]  CSN: [id number]    YOB: 1935  Age: 81 y.o. Sex: female    DOA: 1/14/2018 LOS:  LOS: 1 day                Assessment/Plan     Patient Active Problem List   Diagnosis Code    Acute respiratory insufficiency R06.89    COPD exacerbation ([facility]) J44.1    Infiltrate noted on imaging study R93.8    Elevated brain natriuretic peptide (BNP) level R79.89    Fluid overload E87.70    Atrial fibrillation with RVR ([facility]) I48.91    History of lung cancer- RUL lobectomy  c 2007 Z85. 80    S/P lobectomy of lung Z90.2    Positive D dimer R79.89    SIRS (systemic inflammatory response syndrome) ([facility]) R65.10    PNA (pneumonia) J18.9    Hypoxia R09.02    Acquired hypothyroidism E03.9    HTN (hypertension) I10    Acute respiratory disease J06.9    CHF exacerbation ([facility]) I50.9            82 yo female admitted for acute respiratory failure, A-fib RVR    CRITICAL CARE PLAN    Resp -   Acute respiratory failure - secondary to COPD exacerbation, continue with solumedrol and inhaled BD. Breathing improving. RLL pleural based irregular densities - patient does not want any further work up or biopsy. ID - Follow up blood cx. Influenza A positive  - on tamiflu  ANTIBIOTICS zosyn, azithromycin. CVS - Monitor HD. acute on chronic systolic CHF - echo with moderately reduced LVF, EF of 35-40%. Moderate diffuse hypokinesis. On lasix. A-fib with RVR - rate controlled on cardizem. On therapeutic lovenox  Elevated troponin - Cardiology following. Thoracic aortic aneurysm - seen by vascular, patient does not want any further work up or intervention. Heme/onc - Follow H&H, plts. INR. Renal - Trend BUN, Cr, follow I/O, teresa in place. Check and replace Mg, K, phos. Endocrine -  Follow FSG    Neuro/ Pain/ Sedation - Fentanyl, ativan/versed prn. Sedation bundle.     GI -regular diet    Prophylaxis - DVT: lovenox, GI: protonix    Palliative care      Disposition : 2-3 days    Review of systems  General: No fevers or chills. Cardiovascular: No chest pain or pressure. No palpitations. Pulmonary: see above   Gastrointestinal: No nausea, vomiting. Physical Exam:  General: Awake, cooperative, no acute distress    HEENT: NC, Atraumatic. PERRLA, anicteric sclerae. Lungs: Exp wheezes bilaterally, rales lower lungs bilaterally. Heart:  Regular  rhythm,  No murmur, No Rubs, No Gallops  Abdomen: Soft, Non distended, Non tender.  +Bowel sounds,   Extremities: No c/c/e  Psych:   Not anxious or agitated. Neurologic:  No acute neurological deficit. Vital signs/Intake and Output:  Visit Vitals    /71    Pulse 81    Temp 98.5 °F (36.9 °C)    Resp 20    Ht 5' 5\" (1.651 m)    Wt 51.7 kg (114 lb)    SpO2 94%    BMI 18.97 kg/m2     Current Shift:  01/16 0701 - 01/16 1900  In: -   Out: 1263 [GTOTL:7890]  Last three shifts:  01/14 1901 - 01/16 0700  In: 300 [P.O.:250;  I.V.:50]  Out: 500 [Urine:500]            Labs: Results:       Chemistry Recent Labs      01/16/18   0546  01/15/18   0930  01/14/18   2330   GLU  147*  135*  128*   NA  142  144  141   K  4.0  3.5  3.6   CL  104  101  98*   CO2  36*  36*  32   BUN  34*  32*  27*   CREA  0.93  1.07  1.24   CA  8.7  8.7  8.9   AGAP  2*  7  11   BUCR  37*  30*  22*   AP  80   --   89   TP  6.8   --   7.5   ALB  2.7*   --   3.1*   GLOB  4.1*   --   4.4*   AGRAT  0.7*   --   0.7*      CBC w/Diff Recent Labs      01/16/18   0546  01/14/18   2330   WBC  9.6  14.9*   RBC  3.89*  4.06*   HGB  11.1*  12.0   HCT  35.8  37.4   PLT  149  233   GRANS   --   82*   LYMPH   --   9*   EOS   --   0      Cardiac Enzymes Recent Labs      01/15/18   0930  01/15/18   0400   CPK  114  120   CKND1  4.0  CALCULATION NOT PERFORMED WHEN RESULT IS BELOW LINEAR LIMIT      Coagulation Recent Labs      01/15/18   0930   PTP  14.9   INR  1.2   APTT  36.2       Lipid Panel No results found for: CHOL, CHOLPOCT, CHOLX, CHLST, CHOLV, [id number], HDL, LDL, LDLC, DLDLP, [id number], VLDLC, VLDL, TGLX, TRIGL, TRIGP, TGLPOCT, CHHD, CHHDX   BNP No results for input(s): BNPP in the last 72 hours.    Liver Enzymes Recent Labs      01/16/18   0546   TP  6.8   ALB  2.7*   AP  80   SGOT  59*      Thyroid Studies Lab Results   Component Value Date/Time    TSH 0.10 01/15/2018 09:30 AM        Procedures/imaging: see electronic medical records for all procedures/Xrays and details which were not copied into this note but were reviewed prior to creation of Plan

## 2018-01-16 NOTE — PROGRESS NOTES
Patient is doing well  Looks like might be going home on comfort measures  Overall doing well if continue to do well ok to transfer out of ICU  BIPAP at night      Governor EREN Vergara.  [facility] [address] [address]  Phone [phone]   Fax [phone]  Pulmonary Critical Care & Sleep Medicine

## 2018-01-16 NOTE — ACP (ADVANCE CARE PLANNING)
Patient states she has an Advance Directive. No copy available. Daughter was asked to bring it in. Patient states her daughter, Arcelia Caro [phone], work [phone] (also legal next of kin) is her MPOA. Mrs. Escoto signed DDNR. Her plan is to complete her treatment and transition to Hospice. Please see Dr. Noé Dwyer note dated 01/16/18 for details of discussion.       Rosmery Montano RN

## 2018-01-17 NOTE — ROUTINE PROCESS
Bedside, Verbal and Written shift change report given to CHRISTOFER Arevalo (oncoming nurse) by Tosha Perez RN (offgoing nurse). Report included the following information SBAR, Kardex, Intake/Output and Recent Results. Assumed care of pt at this time, pt aox4, following commands, denies pain, remains monitored, 4L NC, up with assistance x 2.   1000  Scheduled med's given, family at bedside, VSS.    1200  Pt status unchanged, sitting up in bed, aox4. Scheduled med's given. 1518  SBAR report given to receiving RN  TRANSFER - OUT REPORT:    Verbal report given to Christine Quiroga. RN(name) on Emmy Lazo  being transferred to [address] [place] [address](unit) for routine progression of care       Report consisted of patients Situation, Background, Assessment and   Recommendations(SBAR). Information from the following report(s) SBAR, Kardex, Intake/Output and Recent Results was reviewed with the receiving nurse. Lines:   Peripheral IV 01/16/18 Left Forearm (Active)   Site Assessment Clean, dry, & intact 1/17/2018  4:00 AM   Phlebitis Assessment 0 1/17/2018  4:00 AM   Infiltration Assessment 0 1/17/2018  4:00 AM   Dressing Status Clean, dry, & intact 1/17/2018  4:00 AM   Dressing Type Transparent 1/17/2018  4:00 AM   Hub Color/Line Status Blue 1/17/2018  4:00 AM   Action Taken Open ports on tubing capped 1/17/2018  4:00 AM   Alcohol Cap Used Yes 1/17/2018  4:00 AM        Opportunity for questions and clarification was provided.       Patient transported with:   [id number]

## 2018-01-17 NOTE — PROGRESS NOTES
Patient refused to wear BIPAP tonight, stated she was doing well and wanted to go without. Will continue to monitor.

## 2018-01-17 NOTE — PROGRESS NOTES
Hospitalist Progress Note    Patient: Talat Mora MRN: [id number]  CSN: [id number]    YOB: 1935  Age: 81 y.o. Sex: female    DOA: 1/14/2018 LOS:  LOS: 2 days                Assessment/Plan     Patient Active Problem List   Diagnosis Code    Acute respiratory insufficiency R06.89    COPD exacerbation ([facility]) J44.1    Infiltrate noted on imaging study R93.8    Elevated brain natriuretic peptide (BNP) level R79.89    Fluid overload E87.70    Atrial fibrillation with RVR ([facility]) I48.91    History of lung cancer- RUL lobectomy  c 2007 Z85. 80    S/P lobectomy of lung Z90.2    Positive D dimer R79.89    SIRS (systemic inflammatory response syndrome) ([facility]) R65.10    PNA (pneumonia) J18.9    Hypoxia R09.02    Acquired hypothyroidism E03.9    HTN (hypertension) I10    Acute respiratory disease J06.9    CHF exacerbation ([facility]) I50.9    Panlobular emphysema ([facility]) J43.1    Right lower lobe lung mass- pleural based with hilar adenopathy R91.8            82 yo female admitted for acute respiratory failure, A-fib RVR    CRITICAL CARE PLAN    Resp -   Acute respiratory failure - secondary to COPD exacerbation, continue with solumedrol and inhaled BD. Breathing improving. RLL pleural based irregular densities - patient does not want any further work up or biopsy. ID - Follow up blood cx. Influenza A positive  - on tamiflu  ANTIBIOTICS zosyn, azithromycin. CVS - Monitor HD. acute on chronic systolic CHF - echo with moderately reduced LVF, EF of 35-40%. Moderate diffuse hypokinesis. On lasix. A-fib with RVR - rate controlled on cardizem. On therapeutic lovenox  Elevated troponin - Cardiology following. Thoracic aortic aneurysm - seen by vascular, patient does not want any further work up or intervention. Heme/onc - Follow H&H, plts. INR. Renal - Trend BUN, Cr, follow I/O, teresa in place. Check and replace Mg, K, phos.     Endocrine -  Follow FSG    Neuro/ Pain/ Sedation - Fentanyl, ativan/versed prn. Sedation bundle. GI -regular diet    Prophylaxis - DVT: lovenox, GI: protonix    Palliative care    Transfer to medical    Discharge home tomorrow with hospice. Disposition : tomorrow    Review of systems  General: No fevers or chills. Cardiovascular: No chest pain or pressure. No palpitations. Pulmonary: see above   Gastrointestinal: No nausea, vomiting. Physical Exam:  General: Awake, cooperative, no acute distress    HEENT: NC, Atraumatic. PERRLA, anicteric sclerae. Lungs: Exp wheezes bilaterally, rales lower lungs bilaterally. Heart:  Regular  rhythm,  No murmur, No Rubs, No Gallops  Abdomen: Soft, Non distended, Non tender.  +Bowel sounds,   Extremities: No c/c/e  Psych:   Not anxious or agitated. Neurologic:  No acute neurological deficit.            Vital signs/Intake and Output:  Visit Vitals    /80    Pulse 73    Temp 97.6 °F (36.4 °C)    Resp 20    Ht 5' 5\" (1.651 m)    Wt 52.3 kg (115 lb 4.8 oz)    SpO2 100%    Breastfeeding No    BMI 19.19 kg/m2     Current Shift:  01/17 0701 - 01/17 1900  In: 50 [I.V.:50]  Out: 800 [Urine:800]  Last three shifts:  01/15 1901 - 01/17 0700  In: 1020 [P.O.:1020]  Out: 6480 [Urine:1825]            Labs: Results:       Chemistry Recent Labs      01/17/18   0450  01/16/18   0546  01/15/18   0930  01/14/18   2330   GLU   --   147*  135*  128*   NA   --   142  144  141   K   --   4.0  3.5  3.6   CL   --   104  101  98*   CO2   --   36*  36*  32   BUN   --   34*  32*  27*   CREA  0.72  0.93  1.07  1.24   CA   --   8.7  8.7  8.9   AGAP   --   2*  7  11   BUCR   --   37*  30*  22*   AP   --   80   --   89   TP   --   6.8   --   7.5   ALB   --   2.7*   --   3.1*   GLOB   --   4.1*   --   4.4*   AGRAT   --   0.7*   --   0.7*      CBC w/Diff Recent Labs      01/17/18   0450  01/16/18   0546  01/14/18   2330   WBC   --   9.6  14.9*   RBC   --   3.89*  4.06*   HGB   --   11.1*  12.0   HCT   --   35.8  37.4   PLT  161  149  233 GRANS   --    --   82*   LYMPH   --    --   9*   EOS   --    --   0      Cardiac Enzymes Recent Labs      01/15/18   0930  01/15/18   0400   CPK  114  120   CKND1  4.0  CALCULATION NOT PERFORMED WHEN RESULT IS BELOW LINEAR LIMIT      Coagulation Recent Labs      01/15/18   0930   PTP  14.9   INR  1.2   APTT  36.2       Lipid Panel No results found for: CHOL, CHOLPOCT, CHOLX, CHLST, CHOLV, 119382, HDL, LDL, LDLC, DLDLP, 922290, VLDLC, VLDL, TGLX, TRIGL, TRIGP, TGLPOCT, CHHD, CHHDX   BNP No results for input(s): BNPP in the last 72 hours.    Liver Enzymes Recent Labs      01/16/18   0546   TP  6.8   ALB  2.7*   AP  80   SGOT  59*      Thyroid Studies Lab Results   Component Value Date/Time    TSH 0.10 01/15/2018 09:30 AM        Procedures/imaging: see electronic medical records for all procedures/Xrays and details which were not copied into this note but were reviewed prior to creation of Plan

## 2018-01-17 NOTE — INTERDISCIPLINARY ROUNDS
CRITICAL CARE INTERDISCIPLINARY ROUNDS    Patient Information:    Name:   Josselyn Number    Age:   81 y.o. Admission Date:   1/14/2018    Critical Care Day: 3    Attending Provider:   Roxana Scott MD      Consultant(s):   Palliative, Pulmonary, Hospitalist    Critical Care Physician:  Dr. Terald Romberg    Code Status: DNR    Problem List:     Patient Active Problem List   Diagnosis Code    Acute respiratory insufficiency R06.89    COPD exacerbation ([address] J44.1    Infiltrate noted on imaging study R93.8    Elevated brain natriuretic peptide (BNP) level R79.89    Fluid overload E87.70    Atrial fibrillation with RVR ([facility]) I48.91    History of lung cancer- RUL lobectomy  c 2007 Z85. 80    S/P lobectomy of lung Z90.2    Positive D dimer R79.89    SIRS (systemic inflammatory response syndrome) ([facility]) R65.10    PNA (pneumonia) J18.9    Hypoxia R09.02    Acquired hypothyroidism E03.9    HTN (hypertension) I10    Acute respiratory disease J06.9    CHF exacerbation ([facility]) I50.9    Panlobular emphysema ([facility]) J43.1    Right lower lobe lung mass- pleural based with hilar adenopathy R91.8       Principal Problem:  Acute respiratory insufficiency    During rounds the following quality care indicators and evidence based practices were addressed :  DVT Prophylaxis and PUD Prophylaxis Brower Day 0  Central Line Day:0 Isolation: Droplet ;  Antibiotic Stewardship: yes; Probiotics Necessary: No    Ventilator Bundle:  Ventilator Bundle Order Set: N/A Sedation Vacation N/A; Spontaneous Breathing Trial N/A; RestraintsN/A (Order, Necessity, Documentation)  Vent Day: N/A    Sepsis Order Set: No or Elements of Sepsis Bundle Reviewed (Fluids, Antibiotics, Lactic Acid, Cultures, Vasopressors, Steriods, Glycemic control, Peak Plateau)    Glycemic Control:   Recent Labs      01/16/18   0546  01/15/18   0930  01/14/18   2330   GLU  147*  135*  128*   ;  Recent Labs      01/15/18   0935  01/14/18   2349   PHI  7.392  7.374   PCO2I  54.4* 55.1*   PO2I  53*  118*    Adjustments N/A    Heart Failure:    Not applicable     SCIP Measures for other Surgeries:   Not applicable CHG Bath Daily on All Ortho Yes. Pneumonia:    Blood Cultures before Initial Antibiotic    Interdisciplinary team rounds were held with the following team membersHospice. Plan of care discussed. Goals of Care/ Recommendations: Maintain Oxygen levels  See clinical pathway and/or care plan for interventions and desired outcomes.     Critical Care Discharge Status:  Red   Yellow Yes   Tiffany Sanders

## 2018-01-17 NOTE — ROUTINE PROCESS
1905 Bedside verbal shift change report received from Renetta Harding RN(offgoing nurse) given to Joe Trotter RN(oncoming nurse). Report included SBAR,MAR,KARDEX,TELE,I/O    2300  at bedside talking with patient. 2365 Bedside verbal shift change report given to Renetta Harding RN(oncoming nurse) by Joe Trotter RN(offgoing nurse).  Report included SBAR,MAR,KARDEX,TELE,I/O

## 2018-01-17 NOTE — PROGRESS NOTES
NUTRITION SCREENING    Recommendations: Continue w/ POC    RD ASSESSMENT/PLAN:     Diet:  Regular 2 GM NA Height: 5' 5\" (165.1 cm)     Food Allergies: NKFA  Weight: 52.3 kg (115 lb 4.8 oz)    PO Intake:  Patient Vitals for the past 100 hrs:   % Diet Eaten   01/16/18 0900 75 %      BMI: 19.2 kg/m^2 is  normal weight (18.5%-24.9% BMI)      PMH: COPD, HTN, lung ca    Current Hospital Problems: Pt admitted for acute respiratory failure, A-fib RVR. Per palliative care note pt wants comfort measures and will be going home with hospices. At this time not recommending aggressive nutrition. Will rescreen per policy.      REASON FOR ASSESSMENT:   []  RN Referral:    [x] MST score >/=2  Malnutrition Screening Tool (MST):  Recently Lost Weight Without Trying: No  If Yes, How Much Weight Loss: Unsure  Eating Poorly Due to Decreased Appetite: No  MST Score: Marilu Osborn [address], RD  Pager: [phone]

## 2018-01-17 NOTE — PROGRESS NOTES
Dilia Murrieta M.D  [address] [facility]  Phone [phone] Fax [phone]  Pulmonary Critical Care & Sleep Medicine       Name: Olegario Zepeda MRN: [id number]   : 1935 Hospital: [facility] [place]   Date: 2018        ICU Follow Up    Requesting MD:                                                  Reason for CC Consult:    IMPRESSION:   Patient Active Problem List   Diagnosis Code    Acute respiratory insufficiency R06.89    COPD exacerbation ([address] J44.1    Infiltrate noted on imaging study R93.8    Elevated brain natriuretic peptide (BNP) level R79.89    Fluid overload E87.70    Atrial fibrillation with RVR (HCC) I48.91    History of lung cancer- RUL lobectomy  c  Z85. 80    S/P lobectomy of lung Z90.2    Positive D dimer R79.89    SIRS (systemic inflammatory response syndrome) ([facility]) R65.10    PNA (pneumonia) J18.9    Hypoxia R09.02    Acquired hypothyroidism E03.9    HTN (hypertension) I10    Acute respiratory disease J06.9    CHF exacerbation ([facility]) I50.9    Panlobular emphysema ([facility]) J43.1    Right lower lobe lung mass- pleural based with hilar adenopathy R91.8 ·   Acute respiratory failure on chronic respiratory failure <2litero oxygen at home>  · COPD and possible COPD exacerbation  · H/o Hyperthyroidism on methimazole  · Came with AFIB RVR  · Elevated troponin and elevated BNP <Cardio Consulted from ER>  · Abnormal CT chest  · Received lasix, and contrast worry on contrast induced nephropathy. PLAN:   = additional lasix  = dc iv steroid change to po taper over next 5-7 days  = dc zosyn and iv zithromax  = start po augmentin and zithromax to finish total 5-7 days  = spiriva, advair, albuterol prn on discharge  = on full dose anticoagulation per cardiology . ..  Patient is going on hospice please discuss with cardiology and decide on long term anticoagulation for afib.  = echo showed EF 35% with pulmonary htn will add po lasix daily  = if bp permits will benefit for ACE and Betablocker . ..defer to cardiology    -- RLL pleural abnormality with subcarinal lymphadenoapthy is concerning for malignancy in back ground of severe emphysema. . Discussed with patient seems like palliative also had discussion does not wish aggressive measures  -- Flu positive on oseltamivir  - Ok to transfer out of ICU   CVS: Lasix po daily, Betablocker ace per cardiology, anticoagulation per cardiology   RS: Doing ok. Looks like in COPD exacerbation. Wheezing improved decrease solumedrol to po On  Atrovent and stop albuterol due to afib. ON  budesonide. RLL mass looks very concerning for malignancy. Concerning for lung mass refuse work up. Plan to go for hospice. Advair/spiriva/albuterol on discharge  ID:Pan culture. Dc on augmentin and zithromax. ENDO:TSH is 0.10 get Free T4 and adjust methimazole accordingly  GI:PPI   RENAL:CR and K is better  HEMATOLOGY:HBstable PLT stable. MUSCULOSKELETAL:No acute issue  PAIN AND SEDATION: No acute issue  ADVANCE DIRECTIVE: Plan to go on home hospice  FAMILY DISCUSSION:Spoke with patient at length  Vascular: consult appreicated suggested surgeries needed but patient does not wish to persue further. Quality Care: PPI, DVT prophylaxis, HOB elevated, Infection control all reviewed and addressed. Events and notes from last 24 hours reviewed. Care plan discussed with nursing CC TIME:40 min    · Labs and images personally seen and available reports reviewed. · All current medicines are reviewed and doses and prescription adjusted. Subjective/History: Mira Gallegos is a 81 y.o. female who comes to the ed with complaints of sob. Patient states she had been feeling sob and some URI type of symptoms for the past 5 days. She went to an Urgent care facility where she was told she may have Bronchitis and was given Prednisone and \"some Abx\".  Despite of taking those atr home, she continued to feel sob to a point where she couldn't even move around with feeling sob. She also had some subjective low grade temp at home. In the ED She was noted to be in A fib with rvr and she spontaneously converted to NSR. She had to be placed on BiPAP due to resp distress. labx showed elevated bnp, trops and d dimer. Cardiology was consulted. Neb, steroids, IVF and lasix were ordered.      Admitted to ICU   Managed with BIPAP last night  Chart reviewed  H/o Lung cancer 10 years back never followed with pulmonary  Recently been treated for exacerbation and was on steroids and antibiotics  Doing ok in am  No fever but sputum and greenish flame    1/16/18  Influenza a positive  No overnight event  Cardiology and Vascular consult appreciated  Palliative also following  Family does not wish any aggressive care or biopsy of lung or vascular surgery  No overnight event  Drop in PLT to 149  TSH 0.10    1/17/18  = Doing well  = no overnight event  = plan for hospice noted  = does not want any work up for lung mass or aneurysm  = on 3-4 liter oxygen   = Refused BIPAP at night   = cr ok plt ok       Current Facility-Administered Medications   Medication Dose Route Frequency    [START ON 1/18/2018] predniSONE (DELTASONE) tablet 40 mg  40 mg Oral DAILY WITH BREAKFAST    amoxicillin-clavulanate (AUGMENTIN) 875-125 mg per tablet 1 Tab  1 Tab Oral Q12H    [START ON 1/18/2018] azithromycin (ZITHROMAX) tablet 500 mg  500 mg Oral DAILY    PARoxetine (PAXIL) tablet 20 mg  20 mg Oral DAILY    methIMAzole (TAPAZOLE) tablet 2.5 mg  2.5 mg Oral DAILY    guaiFENesin ER (MUCINEX) tablet 600 mg  600 mg Oral Q12H    budesonide (PULMICORT) 500 mcg/2 ml nebulizer suspension  500 mcg Nebulization BID RT    ipratropium (ATROVENT) 0.02 % nebulizer solution 0.5 mg  0.5 mg Nebulization Q4H RT    insulin lispro (HUMALOG) injection   SubCUTAneous AC&HS    dilTIAZem (CARDIZEM) IR tablet 30 mg  30 mg Oral TIDAC    enoxaparin (LOVENOX) injection 50 mg  1 mg/kg SubCUTAneous Q12H    oseltamivir (TAMIFLU) capsule 30 mg  30 mg Oral Q12H    nystatin (MYCOSTATIN) 100,000 unit/mL oral suspension 500,000 Units  500,000 Units Oral QID         Objective:   Vital Signs:    Visit Vitals    /83    Pulse 76    Temp 97.7 °F (36.5 °C)    Resp 16    Ht 5' 5\" (1.651 m)    Wt 52.3 kg (115 lb 4.8 oz)    SpO2 97%    Breastfeeding No    BMI 19.19 kg/m2       O2 Device: Nasal cannula   O2 Flow Rate (L/min): 4 l/min   Temp (24hrs), Av.7 °F (36.5 °C), Min:97.1 °F (36.2 °C), Max:98.1 °F (36.7 °C)       Intake/Output:   Last shift:       07 -  190  In: 50 [I.V.:50]  Out: 200 [Urine:200]  Last 3 shifts: 01/15 190 -  0700  In: 1020 [P.O.:1020]  Out: 1825 [Urine:1825]    Intake/Output Summary (Last 24 hours) at 18 1241  Last data filed at 18 1145   Gross per 24 hour   Intake             1010 ml   Output             1700 ml   Net             -690 ml     Hemodynamics:   . @MAP     . @CVP           Review of Systems:  HEENT: No epistaxis, no nasal drainage, no difficulty in swallowing, no redness in eyes  Respiratory: as above  Cardiovascular: no chest pain, no palpitations, no chronic leg edema, no syncope  Gastrointestinal: no abd pain, no vomiting, no diarrhea, no bleeding symptoms  Genitourinary: No urinary symptoms or hematuria  Integument/breast: No ulcers or rashes      Objective:    General: comfortable, no acute distress  HEENT: pupils reactive, sclera anicteric, EOM intact  Neck: No adenopathy or thyroid swelling, no lymphadenopathy or JVD, supple  CVS: S1S2 no murmurs  RS: Mod AE bilaterally, Bilateral extensive wheezing and RLL rales  Abd: soft, non tender, no hepatosplenomegaly  Neuro: non focal, awake, alert  Extrm: no leg edema, clubbing or cyanosis  Lymph node: No obvious palpable lymph node appreciated.   Skin: no rash  CBC w/Diff Recent Labs      18   0450  18   0546  18   2330   WBC   --   9.6  14.9*   RBC --   3.89*  4.06*   HGB   --   11.1*  12.0   HCT   --   35.8  37.4   PLT  161  149  233   GRANS   --    --   82*   LYMPH   --    --   9*   EOS   --    --   0        Chemistry Recent Labs      01/17/18   0450  01/16/18   0546  01/15/18   0930  01/14/18   2330   GLU   --   147*  135*  128*   NA   --   142  144  141   K   --   4.0  3.5  3.6   CL   --   104  101  98*   CO2   --   36*  36*  32   BUN   --   34*  32*  27*   CREA  0.72  0.93  1.07  1.24   CA   --   8.7  8.7  8.9   MG   --   2.1  2.3  1.7   AGAP   --   2*  7  11   BUCR   --   37*  30*  22*   AP   --   80   --   89   TP   --   6.8   --   7.5   ALB   --   2.7*   --   3.1*   GLOB   --   4.1*   --   4.4*   AGRAT   --   0.7*   --   0.7*        Lactic Acid Lactic acid   Date Value Ref Range Status   01/15/2018 2.2 (HH) 0.4 - 2.0 MMOL/L Final     Comment:     CALLED TO AND CORRECTLY REPEATED BY:  RICARDO AGUILAR RN ICU ON 1/15/18 AT 1112 TO MLK       Recent Labs      01/15/18   0945   LAC  2.2*        Micro  Recent Labs      01/15/18   0945   CULT  NO GROWTH 2 DAYS  NO GROWTH 2 DAYS     Recent Labs      01/15/18   0945   CULT  NO GROWTH 2 DAYS  NO GROWTH 2 DAYS        ABG Recent Labs      01/15/18   0935  01/14/18   2349   PHI  7.392  7.374   PCO2I  54.4*  55.1*   PO2I  53*  118*   HCO3I  33.1*  32.1*   FIO2I   --   90        Liver Enzymes Protein, total   Date Value Ref Range Status   01/16/2018 6.8 6.4 - 8.2 g/dL Final     Albumin   Date Value Ref Range Status   01/16/2018 2.7 (L) 3.4 - 5.0 g/dL Final     Globulin   Date Value Ref Range Status   01/16/2018 4.1 (H) 2.0 - 4.0 g/dL Final     A-G Ratio   Date Value Ref Range Status   01/16/2018 0.7 (L) 0.8 - 1.7   Final     AST (SGOT)   Date Value Ref Range Status   01/16/2018 59 (H) 15 - 37 U/L Final     Alk.  phosphatase   Date Value Ref Range Status   01/16/2018 80 45 - 117 U/L Final     Recent Labs      01/16/18   0546  01/14/18   2330   TP  6.8  7.5   ALB  2.7*  3.1*   GLOB  4.1*  4.4*   AGRAT  0.7*  0.7*   SGOT 59*  73*   AP  80  89        Cardiac Enzymes No results found for: CPK, CK, CKMMB, CKMB, RCK3, CKMBT, CKNDX, CKND1, MARCIA, TROPT, TROIQ, JUSTYN, TROPT, TNIPOC, BNP, BNPP     BNP No results found for: BNP, BNPP, XBNPT     Coagulation Recent Labs      01/15/18   0930   PTP  14.9   INR  1.2   APTT  36.2         Thyroid  Lab Results   Component Value Date/Time    TSH 0.10 01/15/2018 09:30 AM          Lipid Panel No results found for: CHOL, CHOLPOCT, CHOLX, CHLST, CHOLV, 052129, HDL, LDL, LDLC, DLDLP, 589864, VLDLC, VLDL, TGLX, TRIGL, TRIGP, TGLPOCT, CHHD, CHHDX       Urinalysis Lab Results   Component Value Date/Time    Color YELLOW 08/02/2017 04:20 PM    Appearance CLEAR 08/02/2017 04:20 PM    Specific gravity 1.008 08/02/2017 04:20 PM    pH (UA) 8.0 08/02/2017 04:20 PM    Protein 30 08/02/2017 04:20 PM    Glucose NEGATIVE  08/02/2017 04:20 PM    Ketone NEGATIVE  08/02/2017 04:20 PM    Bilirubin NEGATIVE  08/02/2017 04:20 PM    Urobilinogen 0.2 08/02/2017 04:20 PM    Nitrites NEGATIVE  08/02/2017 04:20 PM    Leukocyte Esterase NEGATIVE  08/02/2017 04:20 PM    Epithelial cells FEW 08/02/2017 04:20 PM    Bacteria FEW 08/02/2017 04:20 PM    WBC 0 to 3 08/02/2017 04:20 PM    RBC 0 to 3 08/02/2017 04:20 PM        XR (Most Recent). CXR reviewed by me and compared with previous CXR   Results from Hospital Encounter encounter on 01/14/18   XR CHEST PORT   Narrative CHEST AP PORTABLE    Indication: Hypoxia, shortness of breath. Comparison: X-ray 01/14/2018. Findings: Stable borderline cardiomegaly without evidence for vascular  congestion. Stable mediastinal silhouette with aortic atherosclerosis. Lungs  appear hyperinflated. There is right-sided volume loss with elevation of the  right hemidiaphragm. Peripheral right basilar streaky and patchy alveolar  opacity and reticular and hazy opacity at the left lung base without appreciable  change. Upper lung zones appear clear with anastomotic staples at the right  apex.  No evidence for pleural effusion or pneumothorax. No acute osseous  abnormality identified. Impression IMPRESSION:    No significant interval change. Bibasal streaky and patchy opacity suspicious  for scarring and/or superimposed infiltrate, stable. CT (Most Recent)   Results from Hospital Encounter encounter on 01/14/18   CTA CHEST W OR W WO CONT   Narrative CTA CHEST WITH IV CONTRAST    INDICATION: Dyspnea, elevated d-dimer, rule out pulmonary embolus    TECHNIQUE: Volumetric scanning with IV contrast. Thin overlapping coronal and  sagittal MIP reconstructions. One or more dose reduction techniques were used on  this CT: automated exposure control, adjustment of the mAs and/or kVp according  to patient's size, and iterative reconstruction techniques. The specific  techniques utilized on this CT exam have been documented in the patient's  electronic medical record. COMPARISON: Chest x-ray of the same day and chest CT 11/10/2010    FINDINGS:    General comment regarding exam quality:  Overall quality: Satisfactory. Adequacy of bolus: Satisfactory. Adequacy of suspended respiration: Satisfactory. Any other factors affecting exam quality: None. Heart and vasculature: Ascending abdominal aorta now measures about 4.7 x 4.6  cm. Previously it measured 4.3 cm At the arch, the caliber is about 3.8 cm. 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 This has calcifications peripherally and does  not contain IV contrast. Ulcerative plaque and calcifications of the descending  thoracic aorta which now shows caliber of 3.9 x 4.5 cm. Previously it measured  3.9 cm. Admixed contrast due to timing of the bolus is present at the descending  aorta. This area is not maximally evaluated. No periaortic hematoma or definite  dissection. No evidence of pulmonary embolus    Remaining mediastinum: Tracheomegaly.  Subcarinal masslike focus measures up to  3.4 x 1.9 cm. Smaller paratracheal nodes. Lungs and pleural spaces: Postop changes at the right upper lobe from resection. Extensive centrilobular emphysema with bulla present. No pneumothorax. Fibrotic  banding and thickening of intralobular septae. At the right lower lobe there is  a soft tissue focus abutting the pleural surface which measures about 2.3 x 2.5  cm, inflammatory, scarring or infiltrate. Mass also is possible. At the left  lower lobe confluent reticular densities, more numerous than before present with  some honeycombing as well as soft tissue components. Chest wall: No acute or aggressive bony abnormalities. Right lateral mid rib  defects, probably from the prior surgery. Visualized upper abdomen: Heterogeneous appearance of the gallbladder. There  could be sludge or gallstones. Consider ultrasound if the patient is symptomatic  in this area. Impression IMPRESSION:    No evidence of pulmonary embolus. 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 Consider vascular consultation. Extensive emphysematous changes with fibrous worsening fibrosis and  honeycombing. Pleural-based irregular densities at right lower lobe could be inflammatory or  infectious versus neoplasm. Suspect there is sludge or debris within the gallbladder. Subcarinal lymphadenopathy. I discussed the critical findings with Dr. López Benavides at 3:00 AM.           EKG No results found for this or any previous visit. ECHO SUMMARY:  Left ventricle: Systolic function was moderately reduced. Ejection fraction   was estimated in the range of 35 % to 40 %. There was moderate diffuse hypokinesis. There was moderate asymmetric   hypertrophy of the basal wall. Doppler parameters  were consistent with abnormal left ventricular relaxation (grade 1 diastolic   dysfunction). Ventricular septum: There was paradoxical motion. Right ventricle:  The size was normal. Systolic function was normal. Systolic   pressure was mildly to moderately  increased. Estimated peak pressure was 40 mmHg. PFT No flowsheet data found. Other ASA reactivity:   Pre-albumin:   Ionized Calcium:   NH4:   T3, FT4:  Cortisol:  Urine Osm:  Urine Lytes:   HbA1c:          Imaging:  I have personally reviewed the patients radiographs and have reviewed the reports:     Best practice :  Fluids started at 30 ml/kg with aim to keep CVP 8 or above  Lactic acid ordered- initial and repeat Q6hrs if elevated till normalized. Cultures drawn. Antibiotic administered within 1hr-ICU  And 3hrs ED  Pressors aim MAP >65mmHg  Steriods  Glycemic control  Mech. Ventilated patients- aim to keep peak plateau pressure 95-99VK H2O. Sress ulcer prophylaxis  DVT prophylaxis    Vent bundle, Brower bundle and central line bundle followed. Mackenzie Osuna M.D.   Pulmonary Critical Care & Sleep Medicine

## 2018-01-17 NOTE — PROGRESS NOTES
Cardiology Progress Note        Patient: Justino Madera        Sex: female          DOA: 1/14/2018  YOB: 1935      Age:  81 y.o.        LOS:  LOS: 2 days   Assessment/Plan     Principal Problem:    Acute respiratory insufficiency (1/15/2018)    Active Problems:    COPD exacerbation ([address] (1/15/2018)      Infiltrate noted on imaging study (1/15/2018)      Elevated brain natriuretic peptide (BNP) level (1/15/2018)      Fluid overload (1/15/2018)      Atrial fibrillation with RVR ([address] (1/15/2018)      History of lung cancer- RUL lobectomy  c 2007 (1/15/2018)      S/P lobectomy of lung (1/15/2018)      Positive D dimer (1/15/2018)      SIRS (systemic inflammatory response syndrome) ([address] (1/15/2018)      PNA (pneumonia) (1/15/2018)      Hypoxia (1/15/2018)      Acquired hypothyroidism (1/15/2018)      HTN (hypertension) (1/15/2018)      Acute respiratory disease (1/15/2018)      CHF exacerbation ([address] (1/15/2018)      Panlobular emphysema ([address] (1/16/2018)      Right lower lobe lung mass- pleural based with hilar adenopathy (1/16/2018)        Plan:  Doing well  Can not give BB because of COPD  Maintaining NSR  Discussed with pt about anticoagulation, she want aspirin  DC lovenox before discharge and start aspirin. I will sign off and please call if any questions/concerned. Subjective:    cc:  No CP      REVIEW OF SYSTEMS: NO CP, SOB, N,V,D, F,C  Objective:      Visit Vitals    /83    Pulse 76    Temp 97.7 °F (36.5 °C)    Resp 16    Ht 5' 5\" (1.651 m)    Wt 52.3 kg (115 lb 4.8 oz)    SpO2 97%    Breastfeeding No    BMI 19.19 kg/m2     Body mass index is 19.19 kg/(m^2). Physical Exam:  General Appearance: Comfortable, not using accessory muscles of respiration. HEENT: SOCRATES. HEAD: Atraumatic  NECK: No JVD, no thyroidomeglay. LUNGS: Clear bilaterally. HEART: S1+S2 audible, no murmur, no pericardial rub.      ABD: Non-tender, BS Audible    EXT: No edema, and no cysnosis. VASCULAR EXAM: Pulses are intact. PSYCHIATRIC EXAM: Mood is appropriate. MUSCULOSKELETAL: Grossly no joint deformity. NEUROLOGICAL: No motor and sensory deficit, Cranial nerves II-XII intact. Medication:  Current Facility-Administered Medications   Medication Dose Route Frequency    [START ON 1/18/2018] predniSONE (DELTASONE) tablet 40 mg  40 mg Oral DAILY WITH BREAKFAST    amoxicillin-clavulanate (AUGMENTIN) 875-125 mg per tablet 1 Tab  1 Tab Oral Q12H    [START ON 1/18/2018] azithromycin (ZITHROMAX) tablet 500 mg  500 mg Oral DAILY    [START ON 1/18/2018] furosemide (LASIX) tablet 20 mg  20 mg Oral DAILY    oxyCODONE (ROXICODONE INTENSOL) 20 mg/mL concentrated solution 5 mg  5 mg Oral Q3H PRN    ondansetron hcl (ZOFRAN) tablet 4 mg  4 mg Oral Q6H PRN    PARoxetine (PAXIL) tablet 20 mg  20 mg Oral DAILY    methIMAzole (TAPAZOLE) tablet 2.5 mg  2.5 mg Oral DAILY    temazepam (RESTORIL) capsule 30 mg  30 mg Oral QHS PRN    guaiFENesin ER (MUCINEX) tablet 600 mg  600 mg Oral Q12H    budesonide (PULMICORT) 500 mcg/2 ml nebulizer suspension  500 mcg Nebulization BID RT    ipratropium (ATROVENT) 0.02 % nebulizer solution 0.5 mg  0.5 mg Nebulization Q4H RT    insulin lispro (HUMALOG) injection   SubCUTAneous AC&HS    glucose chewable tablet 16 g  4 Tab Oral PRN    glucagon (GLUCAGEN) injection 1 mg  1 mg IntraMUSCular PRN    dextrose (D50W) injection syrg 12.5-25 g  25-50 mL IntraVENous PRN    dilTIAZem (CARDIZEM) IR tablet 30 mg  30 mg Oral TIDAC    enoxaparin (LOVENOX) injection 50 mg  1 mg/kg SubCUTAneous Q12H    acetaminophen (TYLENOL) tablet 650 mg  650 mg Oral Q6H PRN    oseltamivir (TAMIFLU) capsule 30 mg  30 mg Oral Q12H    nystatin (MYCOSTATIN) 100,000 unit/mL oral suspension 500,000 Units  500,000 Units Oral QID               Lab/Data Reviewed: All lab results for the last 24 hours reviewed.      Recent Labs      01/17/18   0450  01/16/18 [id number]  01/14/18   2330   WBC   --   9.6  14.9*   HGB   --   11.1*  12.0   HCT   --   35.8  37.4   PLT  161  149  233     Recent Labs      01/17/18   0450  01/16/18   0546  01/15/18   0930  01/14/18   2330   NA   --   142  144  141   K   --   4.0  3.5  3.6   CL   --   104  101  98*   CO2   --   36*  36*  32   GLU   --   147*  135*  128*   BUN   --   34*  32*  27*   CREA  0.72  0.93  1.07  1.24   CA   --   8.7  8.7  8.9       Signed By: Tony Aguila MD     January 17, 2018

## 2018-01-17 NOTE — PROGRESS NOTES
Palliative Medicine Progress Note  DR. POLLACK'[facility]: [phone] ([phone])  Formerly [facility]: [address] [place] [address] Way: [phone]    Patient Name: Armaan Granados  YOB: 1935    Date of Initial Consult: 1/16/18  Reason for Consult: care decisions  Requesting Provider: Dr. Michi Juares   Primary Care Physician: Alexandru Thomas MD      SUMMARY:   Armaan Granados is a 81y.o. year old with a past history of O2 dependent COPD admitted with acute exacerbation with respiratory failure requiring BIPAP. Tested + for influenza, on tamiflu. Slowly improving, used BIPAP last night. Has developed progressive weakness, wt loss and increasing SOB since having to move in with her daughter 10 months ago. Can't do much but go bed-chair. History of RUL lung cancer resected 2008, had pneumothorax and thorocostomy at that time. Daughter notes increase forgetfullness and some confusion over the last few months, worse since she she became acutely ill. Daughter works but up to now has been able to leave her home during the day. Independent in ADLs. Has an AMD at home, but did not bring it in.  CT chest demonstrates advanced emphesema and a RLL pleural based mass with hilar adenopathy. 1/17/18: Rested well, no pain, but SOB at rest. Doesn't remember telling them she didn't want BIPAP. PALLIATIVE DIAGNOSES:     Patient Active Problem List   Diagnosis Code    Acute respiratory insufficiency R06.89    COPD exacerbation ([facility]) J44.1    Infiltrate noted on imaging study R93.8    Elevated brain natriuretic peptide (BNP) level R79.89    Fluid overload E87.70    Atrial fibrillation with RVR ([facility]) I48.91    History of lung cancer- RUL lobectomy  c 2007 Z85. 80    S/P lobectomy of lung Z90.2    Positive D dimer R79.89    SIRS (systemic inflammatory response syndrome) ([facility]) R65.10    PNA (pneumonia) J18.9    Hypoxia R09.02    Acquired hypothyroidism E03.9    HTN (hypertension) I10    Acute respiratory disease J06.9    CHF exacerbation (HCC) I50.9    Panlobular emphysema (HCC) J43.1    Right lower lobe lung mass- pleural based with hilar adenopathy R91.8     1. PLAN:   1. Met with patient and her daughter with review of current clinical condition. Described options to pursue diagnosis of probable lung cancer and potential treatment. She does not wish to undergo any sort of workup or consider treatment because of her already rapidly worsening quality of life. She prefers to focus on comfort and quality of life withhout additional hospitalizations. Recommended hospice enrollment and described services Her daughter will arrange additional care to allow her to be cared for at home with hospice. DDNR executed. Medications to manage dyspnea ordered. Would consider switching to oral abx and steroids to complete course and expedite home with hospice. Could take long acting diltiazem. Given goals of care long term anticoagulation not appropriate. Questions answered. 1/17/18: For d/c w/ hospice tomorrow. Agrees to trial of low dose oxycodone for dyspnea. 2. Initial consult note routed to primary continuity provider  3.  Communicated plan of care with: Palliative IDT       GOALS OF CARE:     GOALS OF CARE:  Patient/Health Care Proxy Stated Goals: Comfort      TREATMENT PREFERENCES:   Code Status: DNR    Advance Care Planning:  Advance Care Planning 1/16/2018   Patient's Healthcare Decision Maker is: Verbal statement (Legal Next of Kin remains as decision maker)   Primary Decision Maker Name Halley Lucio    Primary Decision Maker Phone Number [phone]   Primary Decision Maker Relationship to Patient Adult child   Confirm Advance Directive Yes, not on file   Does the patient have other document types Do Not Resuscitate       Medical Interventions: Comfort measures   Other Instructions:   Artificially Administered Nutrition: No feeding tube     Other:    As far as possible, the palliative care team has discussed with patient / health care proxy about goals of care / treatment preferences for patient. HISTORY:     History obtained from: patient    CHIEF COMPLAINT: see summary    HPI/SUBJECTIVE:    The patient is:   [x] Verbal and participatory  [] Non-participatory due to:        Clinical Pain Assessment (nonverbal scale for nonverbal patients): Clinical Pain Assessment  Severity: 1          Duration: for how long has pt been experiencing pain (e.g., 2 days, 1 month, years)  Frequency: how often pain is an issue (e.g., several times per day, once every few days, constant)     FUNCTIONAL ASSESSMENT:     Palliative Performance Scale (PPS):  PPS: 50       PSYCHOSOCIAL/SPIRITUAL SCREENING:     Advance Care Planning:  Advance Care Planning 1/16/2018   Patient's Healthcare Decision Maker is: Verbal statement (Legal Next of Kin remains as decision maker)   Primary Decision Maker Name Karin Jones    Primary Decision Maker Phone Number [phone]   Primary Decision Maker Relationship to Patient Adult child   Confirm Advance Directive Yes, not on file   Does the patient have other document types Do Not Resuscitate        Any spiritual / Evangelical concerns:  [] Yes /  [] No    Caregiver Burnout:  [] Yes /  [] No /  [] No Caregiver Present      Anticipatory grief assessment:   [] Normal  / [] Maladaptive       ESAS Anxiety: Anxiety: 0    ESAS Depression: Depression: 0        REVIEW OF SYSTEMS:     Positive and pertinent negative findings in ROS are noted above in HPI. The following systems were [x] reviewed / [] unable to be reviewed as noted in HPI  Other findings are noted below. Systems: constitutional, ears/nose/mouth/throat, respiratory, gastrointestinal, genitourinary, musculoskeletal, integumentary, neurologic, psychiatric, endocrine. Positive findings noted below.   Modified ESAS Completed by: provider   Fatigue: 7 Drowsiness: 0   Depression: 0 Pain: 1   Anxiety: 0 Nausea: 0   Anorexia: 6 Dyspnea: 7     Constipation: Yes     Stool Occurrence(s): 1        PHYSICAL EXAM:     Wt Readings from Last 3 Encounters:   01/17/18 52.3 kg (115 lb 4.8 oz)   01/12/16 52.2 kg (115 lb)     Blood pressure 139/83, pulse 76, temperature 97.7 °F (36.5 °C), resp. rate 16, height 5' 5\" (1.651 m), weight 52.3 kg (115 lb 4.8 oz), SpO2 97 %, not currently breastfeeding.   Pain:  Pain Scale 1: Numeric (0 - 10)  Pain Intensity 1: 0  Pain Onset 1: acute  Pain Location 1: Head  Pain Orientation 1: Anterior  Pain Description 1: Aching  Pain Intervention(s) 1: Medication (see MAR)  Last bowel movement:     Constitutional: frail, ill appearing  Eyes: pupils equal, anicteric  ENMT: no nasal discharge, moist mucous membranes  Cardiovascular: regular rhythm, distal pulses intact  Respiratory: breathing not labored, symmetric  Gastrointestinal: soft non-tender, +bowel sounds  Musculoskeletal: no deformity, no tenderness to palpation  Skin: warm, dry  Neurologic: following commands, moving all extremities  Psychiatric: full affect, no hallucinations  Other:       HISTORY:     Principal Problem:    Acute respiratory insufficiency (1/15/2018)    Active Problems:    COPD exacerbation ([address] (1/15/2018)      Infiltrate noted on imaging study (1/15/2018)      Elevated brain natriuretic peptide (BNP) level (1/15/2018)      Fluid overload (1/15/2018)      Atrial fibrillation with RVR ([address] (1/15/2018)      History of lung cancer- RUL lobectomy  c 2007 (1/15/2018)      S/P lobectomy of lung (1/15/2018)      Positive D dimer (1/15/2018)      SIRS (systemic inflammatory response syndrome) (HCC) (1/15/2018)      PNA (pneumonia) (1/15/2018)      Hypoxia (1/15/2018)      Acquired hypothyroidism (1/15/2018)      HTN (hypertension) (1/15/2018)      Acute respiratory disease (1/15/2018)      CHF exacerbation (HCC) (1/15/2018)      Panlobular emphysema ([address] (1/16/2018)      Right lower lobe lung mass- pleural based with hilar adenopathy (1/16/2018)      Past Medical History:   Diagnosis Date    [facility]     History of lung cancer- RUL lobectomy  c 2007 1/15/2018    Hypertension     [facility]     Panlobular emphysema ([facility] 1/16/2018    Right lower lobe lung mass- pleural based with hilar adenopathy 1/16/2018      Past Surgical History:   Procedure Laterality Date    HX HYSTERECTOMY      HX OTHER SURGICAL      right lung lobe removal      History reviewed. No pertinent family history. History reviewed, no pertinent family history.   Social History   Substance Use Topics    Smoking status: Former Smoker    Smokeless tobacco: Not on file    Alcohol use No     Allergies   Allergen Reactions    Avelox [Moxifloxacin] Rash and Swelling      Current Facility-Administered Medications   Medication Dose Route Frequency    [START ON 1/18/2018] predniSONE (DELTASONE) tablet 40 mg  40 mg Oral DAILY WITH BREAKFAST    amoxicillin-clavulanate (AUGMENTIN) 875-125 mg per tablet 1 Tab  1 Tab Oral Q12H    [START ON 1/18/2018] azithromycin (ZITHROMAX) tablet 500 mg  500 mg Oral DAILY    [START ON 1/18/2018] furosemide (LASIX) tablet 20 mg  20 mg Oral DAILY    oxyCODONE (ROXICODONE INTENSOL) 20 mg/mL concentrated solution 5 mg  5 mg Oral Q3H PRN    ondansetron hcl (ZOFRAN) tablet 4 mg  4 mg Oral Q6H PRN    PARoxetine (PAXIL) tablet 20 mg  20 mg Oral DAILY    methIMAzole (TAPAZOLE) tablet 2.5 mg  2.5 mg Oral DAILY    temazepam (RESTORIL) capsule 30 mg  30 mg Oral QHS PRN    guaiFENesin ER (MUCINEX) tablet 600 mg  600 mg Oral Q12H    budesonide (PULMICORT) 500 mcg/2 ml nebulizer suspension  500 mcg Nebulization BID RT    ipratropium (ATROVENT) 0.02 % nebulizer solution 0.5 mg  0.5 mg Nebulization Q4H RT    insulin lispro (HUMALOG) injection   SubCUTAneous AC&HS    glucose chewable tablet 16 g  4 Tab Oral PRN    glucagon (GLUCAGEN) injection 1 mg  1 mg IntraMUSCular PRN    dextrose (D50W) injection syrg 12.5-25 g  25-50 mL IntraVENous PRN    dilTIAZem (CARDIZEM) IR tablet 30 mg  30 mg Oral TIDAC    enoxaparin (LOVENOX) injection 50 mg  1 mg/kg SubCUTAneous Q12H    acetaminophen (TYLENOL) tablet 650 mg  650 mg Oral Q6H PRN    oseltamivir (TAMIFLU) capsule 30 mg  30 mg Oral Q12H    nystatin (MYCOSTATIN) 100,000 unit/mL oral suspension 500,000 Units  500,000 Units Oral QID        LAB AND IMAGING FINDINGS:     Lab Results   Component Value Date/Time    WBC 9.6 01/16/2018 05:46 AM    HGB 11.1 01/16/2018 05:46 AM    PLATELET 160 92/86/2548 04:50 AM     Lab Results   Component Value Date/Time    Sodium 142 01/16/2018 05:46 AM    Potassium 4.0 01/16/2018 05:46 AM    Chloride 104 01/16/2018 05:46 AM    CO2 36 01/16/2018 05:46 AM    BUN 34 01/16/2018 05:46 AM    Creatinine 0.72 01/17/2018 04:50 AM    Calcium 8.7 01/16/2018 05:46 AM    Magnesium 2.1 01/16/2018 05:46 AM      Lab Results   Component Value Date/Time    AST (SGOT) 59 01/16/2018 05:46 AM    Alk.  phosphatase 80 01/16/2018 05:46 AM    Protein, total 6.8 01/16/2018 05:46 AM    Albumin 2.7 01/16/2018 05:46 AM    Globulin 4.1 01/16/2018 05:46 AM     Lab Results   Component Value Date/Time    INR 1.2 01/15/2018 09:30 AM    Prothrombin time 14.9 01/15/2018 09:30 AM    aPTT 36.2 01/15/2018 09:30 AM      No results found for: IRON, FE, TIBC, IBCT, PSAT, FERR   No results found for: PH, PCO2, PO2  No components found for: [place]   Lab Results   Component Value Date/Time     01/15/2018 09:30 AM    CK - MB 4.6 01/15/2018 09:30 AM              Total time: 25 min  Counseling / coordination time, spent as noted above: 18  > 50% counseling / coordination        Ifrah Dugan MD  Palliative Medicine

## 2018-01-17 NOTE — PROGRESS NOTES
Hospice consultant Kandy Nunez [phone] visited pt and spoke with cm,did inform she will be ordering equipment for delivery tomorrow, possibly can be delayed due to impending weather

## 2018-01-17 NOTE — PROGRESS NOTES
[address] Dosing Services:   Consult for antibiotic dosing of  Augmentin by Dr. Olga Benson  Indication: CAP (replacing Zosyn)  Day of Therapy: 3    Ht Readings from Last 1 Encounters:   01/14/18 165.1 cm (65\")        Wt Readings from Last 1 Encounters:   01/17/18 52.3 kg (115 lb 4.8 oz)          Non-Kinetic Antimicrobial Dosing Regimen:   Current Regimen:  Zosyn 2.25 gm IV every 6 hours  Recommendation: Augmentin 875 mg PO every 12 hours    Serum Creatinine Lab Results   Component Value Date/Time    Creatinine 0.72 01/17/2018 04:50 AM    Creatinine, POC 1.1 01/14/2018 11:57 PM         Creatinine Clearance Estimated Creatinine Clearance: 50.6 mL/min (based on Cr of 0.72).      Temp Temp: 97.7 °F (36.5 °C)       WBC Lab Results   Component Value Date/Time    WBC 9.6 01/16/2018 05:46 AM        H/H Lab Results   Component Value Date/Time    HGB 11.1 01/16/2018 05:46 AM        Platelets    Lab Results   Component Value Date/Time    PLATELET 775 46/25/8696 04:50 AM            Pharmacist TOMMY CordovaD Contact information: [phone]

## 2018-01-17 NOTE — DIABETES MGMT
GLYCEMIC CONTROL PROGRESS NOTE:    Discussed in rounds, no known h/o DM, current A1C 5.0% (wnl)  -transition to po steroids recommend continue POCT +  Humalog Normal Insulin Sensitivity Corrective Coverage  -no corrective coverage needed last 24 hours  -plan to d/c home with hospice    Recent Glucose Results:   Lab Results   Component Value Date/Time    GLUCPOC 131 (H) 01/17/2018 06:40 AM    GLUCPOC 186 (H) 01/16/2018 09:49 PM    GLUCPOC 165 (H) 01/16/2018 04:47 PM     José Luis Chris RN, MS  Glycemic Control Team

## 2018-01-17 NOTE — PROGRESS NOTES
Spoke with patient and daughter Rodri Michel, pt will be using [facility] [phone] pt does live with daughter which she gets off at 5pm today would like to aim for tomorrow for discharge due to work schedule,cm spoke with  and made aware, Hospice notified spoke with Karan Angel and will be calling daughter to set up home arrangements.

## 2018-01-17 NOTE — PROGRESS NOTES
conducted a Follow up consultation and Spiritual Assessment for ALTON JUNEJR. [facility], who is a 81 y.o.,female. Cris visit with patient. She explained that she has decided to move to Hospice at home and is very comfortable with the decision. She talked about her life, loves, support and jani. She has very good support from her only child, her daughter, and trusts her completely to follow her wishes. The  provided the following Interventions:  Continued the relationship of care and support. Listened empathically. Offered prayer and assurance of continued prayer on patients behalf. Chart reviewed. The following outcomes were achieved:  Patient expressed gratitude for Spiritual Care visit. Patient was reviewed in ICU Interdisciplinary Rounds. Assessment:  There are no further spiritual or [employer]ism issues which require Spiritual Care Services intervention at this time. Plan:  Chaplains will continue to follow and will provide pastoral care as needed or requested.  recommends bedside caregivers page the  on duty if patient shows signs of acute spiritual or emotional distress. [facility] [place] [address].    Board Certified   [phone] - Office

## 2018-01-18 NOTE — PROGRESS NOTES
EREN Dunn [facility]  Phone [phone] Fax [phone]  Pulmonary Critical Care & Sleep Medicine       Name: Mira Gallegos MRN: [id number]   : 1935 Hospital: [facility] [place]   Date: 2018        ICU Follow Up    Requesting MD:                                                  Reason for CC Consult:    IMPRESSION:   Patient Active Problem List   Diagnosis Code    Acute respiratory insufficiency R06.89    COPD exacerbation ([address] J44.1    Infiltrate noted on imaging study R93.8    Elevated brain natriuretic peptide (BNP) level R79.89    Fluid overload E87.70    Atrial fibrillation with RVR ([facility]) I48.91    History of lung cancer- RUL lobectomy  c  Z85. 80    S/P lobectomy of lung Z90.2    Positive D dimer R79.89    SIRS (systemic inflammatory response syndrome) ([facility]) R65.10    PNA (pneumonia) J18.9    Hypoxia R09.02    Acquired hypothyroidism E03.9    HTN (hypertension) I10    Acute respiratory disease J06.9    CHF exacerbation ([facility]) I50.9    Panlobular emphysema ([facility]) J43.1    Right lower lobe lung mass- pleural based with hilar adenopathy R91.8 ·   Acute respiratory failure on chronic respiratory failure <2litero oxygen at home>  · COPD and possible COPD exacerbation  · H/o Hyperthyroidism on methimazole  · Came with AFIB RVR  · Elevated troponin and elevated BNP <Cardio Consulted from ER>  · Abnormal CT chest  · Received lasix, and contrast worry on contrast induced nephropathy.     PLAN: Possible dc soon  Still feels sob with exertion but seems like going home on hospice  As patient is on hospice no need for follow up but if we can be of any help please let us know or call our office  No work up for pleural abnormality as per prior discussion   = on po augmentin and zithromax to finish total 5-7 days  = spiriva, advair, albuterol prn on discharge  = on full dose anticoagulation per cardiology . .. Patient is going on hospice please discuss with cardiology and decide on long term anticoagulation for afib.  = echo showed EF 35% with pulmonary htn will add po lasix daily  = if bp permits will benefit for ACE and Betablocker . ..defer to cardiology    -- RLL pleural abnormality with subcarinal lymphadenoapthy is concerning for malignancy in back ground of severe emphysema. . Discussed with patient seems like palliative also had discussion does not wish aggressive measures  -- Flu positive on oseltamivir   CVS: Lasix po daily, Betablocker ace per cardiology, anticoagulation per cardiology  RS: Doing ok. Looks like in COPD exacerbation. Wheezing improved decrease solumedrol to po On  Atrovent and stop albuterol due to afib. ON  budesonide. RLL mass looks very concerning for malignancy. Concerning for lung mass refuse work up. Plan to go for hospice. Advair/spiriva/albuterol on discharge  I spent 25 min of time excluding procedure, with face to face evaluation  >50% on complex decision making, coordination of care and counseling patient. · Labs and images personally seen and available reports reviewed. · All current medicines are reviewed and doses and prescription adjusted. Subjective/History: Anastacia Crocker is a 81 y.o. female who comes to the ed with complaints of sob. Patient states she had been feeling sob and some URI type of symptoms for the past 5 days. She went to an Urgent care facility where she was told she may have Bronchitis and was given Prednisone and \"some Abx\". Despite of taking those atr home, she continued to feel sob to a point where she couldn't even move around with feeling sob. She also had some subjective low grade temp at home. In the ED She was noted to be in A fib with rvr and she spontaneously converted to NSR. She had to be placed on BiPAP due to resp distress. labx showed elevated bnp, trops and d dimer. Cardiology was consulted.  Neb, steroids, IVF and lasix were ordered.      Admitted to ICU   Managed with BIPAP last night  Chart reviewed  H/o Lung cancer 10 years back never followed with pulmonary  Recently been treated for exacerbation and was on steroids and antibiotics  Doing ok in am  No fever but sputum and greenish flame    1/16/18  Influenza a positive  No overnight event  Cardiology and Vascular consult appreciated  Palliative also following  Family does not wish any aggressive care or biopsy of lung or vascular surgery  No overnight event  Drop in PLT to 149  TSH 0.10    1/17/18  = Doing well  = no overnight event  = plan for hospice noted  = does not want any work up for lung mass or aneurysm  = on 3-4 liter oxygen   = Refused BIPAP at night   = cr ok plt ok    1/18/18  Doing well  Still feels sob with exertion  Possible DC with hospice noted  On 3-4 liter oxygen and doing well  OT and PT is working with patient        Current Facility-Administered Medications   Medication Dose Route Frequency    predniSONE (DELTASONE) tablet 40 mg  40 mg Oral DAILY WITH BREAKFAST    amoxicillin-clavulanate (AUGMENTIN) 875-125 mg per tablet 1 Tab  1 Tab Oral Q12H    azithromycin (ZITHROMAX) tablet 500 mg  500 mg Oral DAILY    furosemide (LASIX) tablet 20 mg  20 mg Oral DAILY    PARoxetine (PAXIL) tablet 20 mg  20 mg Oral DAILY    methIMAzole (TAPAZOLE) tablet 2.5 mg  2.5 mg Oral DAILY    guaiFENesin ER (MUCINEX) tablet 600 mg  600 mg Oral Q12H    budesonide (PULMICORT) 500 mcg/2 ml nebulizer suspension  500 mcg Nebulization BID RT    ipratropium (ATROVENT) 0.02 % nebulizer solution 0.5 mg  0.5 mg Nebulization Q4H RT    insulin lispro (HUMALOG) injection   SubCUTAneous AC&HS    dilTIAZem (CARDIZEM) IR tablet 30 mg  30 mg Oral TIDAC    enoxaparin (LOVENOX) injection 50 mg  1 mg/kg SubCUTAneous Q12H    oseltamivir (TAMIFLU) capsule 30 mg  30 mg Oral Q12H    nystatin (MYCOSTATIN) 100,000 unit/mL oral suspension 500,000 Units  500,000 Units Oral QID         Objective:   Vital Signs:    Visit Vitals    /66 (BP 1 Location: Right arm, BP Patient Position: Sitting)    Pulse 96    Temp 98.4 °F (36.9 °C)    Resp 18    Ht 5' 5\" (1.651 m)    Wt 54.4 kg (120 lb)    SpO2 94%    Breastfeeding No    BMI 19.97 kg/m2       O2 Device: Nasal cannula   O2 Flow Rate (L/min): 4 l/min   Temp (24hrs), Av.6 °F (36.4 °C), Min:97.2 °F (36.2 °C), Max:98.4 °F (36.9 °C)       Intake/Output:   Last shift:       0701 -  190  In: -   Out: 750 [Urine:750]  Last 3 shifts: 1901 -  0700  In: 1010 [P.O.:960; I.V.:50]  Out: 2300 [Urine:2300]    Intake/Output Summary (Last 24 hours) at 18 1344  Last data filed at 18 1335   Gross per 24 hour   Intake                0 ml   Output             2150 ml   Net            -2150 ml     Hemodynamics:   . @MAP     . @CVP           Review of Systems:  HEENT: No epistaxis, no nasal drainage, no difficulty in swallowing, no redness in eyes  Respiratory: as above  Cardiovascular: no chest pain, no palpitations, no chronic leg edema, no syncope  Gastrointestinal: no abd pain, no vomiting, no diarrhea, no bleeding symptoms  Genitourinary: No urinary symptoms or hematuria  Integument/breast: No ulcers or rashes      Objective:    General: comfortable, no acute distress  HEENT: pupils reactive, sclera anicteric, EOM intact  Neck: No adenopathy or thyroid swelling, no lymphadenopathy or JVD, supple  CVS: S1S2 no murmurs  RS: Mod AE bilaterally, Bilateral extensive wheezing and RLL rales  Abd: soft, non tender, no hepatosplenomegaly  Neuro: non focal, awake, alert  Extrm: no leg edema, clubbing or cyanosis  Lymph node: No obvious palpable lymph node appreciated.   Skin: no rash  CBC w/Diff Recent Labs      18   0450  18   0546   WBC   --   9.6   RBC   --   3.89*   HGB   --   11.1*   HCT   --   35.8   PLT  161  149        Chemistry Recent Labs      18   0450  18   0546   GLU   --   147* NA   --   142   K   --   4.0   CL   --   104   CO2   --   36*   BUN   --   34*   CREA  0.72  0.93   CA   --   8.7   MG   --   2.1   AGAP   --   2*   BUCR   --   37*   AP   --   80   TP   --   6.8   ALB   --   2.7*   GLOB   --   4.1*   AGRAT   --   0.7*        Lactic Acid Lactic acid   Date Value Ref Range Status   01/15/2018 2.2 (HH) 0.4 - 2.0 MMOL/L Final     Comment:     CALLED TO AND CORRECTLY REPEATED BY:  RICARDO AGUILAR RN ICU ON 1/15/18 AT 1112 TO MLK       No results for input(s): LAC in the last 72 hours. Micro  No results for input(s): SDES, CULT in the last 72 hours. No results for input(s): CULT in the last 72 hours. ABG No results for input(s): PHI, PHI, POC2, PCO2I, PO2, PO2I, HCO3, HCO3I, FIO2, FIO2I in the last 72 hours. Liver Enzymes Protein, total   Date Value Ref Range Status   01/16/2018 6.8 6.4 - 8.2 g/dL Final     Albumin   Date Value Ref Range Status   01/16/2018 2.7 (L) 3.4 - 5.0 g/dL Final     Globulin   Date Value Ref Range Status   01/16/2018 4.1 (H) 2.0 - 4.0 g/dL Final     A-G Ratio   Date Value Ref Range Status   01/16/2018 0.7 (L) 0.8 - 1.7   Final     AST (SGOT)   Date Value Ref Range Status   01/16/2018 59 (H) 15 - 37 U/L Final     Alk. phosphatase   Date Value Ref Range Status   01/16/2018 80 45 - 117 U/L Final     Recent Labs      01/16/18   0546   TP  6.8   ALB  2.7*   GLOB  4.1*   AGRAT  0.7*   SGOT  59*   AP  80        Cardiac Enzymes No results found for: CPK, CK, CKMMB, CKMB, RCK3, CKMBT, CKNDX, CKND1, MARCIA, TROPT, TROIQ, JUSTYN, TROPT, TNIPOC, BNP, BNPP     BNP No results found for: BNP, BNPP, XBNPT     Coagulation No results for input(s): PTP, INR, APTT in the last 72 hours.     No lab exists for component: INREXT, INREXT      Thyroid  Lab Results   Component Value Date/Time    TSH 0.10 01/15/2018 09:30 AM          Lipid Panel No results found for: CHOL, CHOLPOCT, CHOLX, CHLST, CHOLV, 553016, HDL, LDL, LDLC, DLDLP, 044660, VLDLC, VLDL, TGLX, TRIGL, TRIGP, TGLPOCT, [facility], [facility]       Urinalysis Lab Results   Component Value Date/Time    Color YELLOW 08/02/2017 04:20 PM    Appearance CLEAR 08/02/2017 04:20 PM    Specific gravity 1.008 08/02/2017 04:20 PM    pH (UA) 8.0 08/02/2017 04:20 PM    Protein 30 08/02/2017 04:20 PM    Glucose NEGATIVE  08/02/2017 04:20 PM    Ketone NEGATIVE  08/02/2017 04:20 PM    Bilirubin NEGATIVE  08/02/2017 04:20 PM    Urobilinogen 0.2 08/02/2017 04:20 PM    Nitrites NEGATIVE  08/02/2017 04:20 PM    Leukocyte Esterase NEGATIVE  08/02/2017 04:20 PM    Epithelial cells FEW 08/02/2017 04:20 PM    Bacteria FEW 08/02/2017 04:20 PM    WBC 0 to 3 08/02/2017 04:20 PM    RBC 0 to 3 08/02/2017 04:20 PM        XR (Most Recent). CXR reviewed by me and compared with previous CXR   Results from Hospital Encounter encounter on 01/14/18   XR CHEST PORT   Narrative CHEST AP PORTABLE    Indication: Hypoxia, shortness of breath. Comparison: X-ray 01/14/2018. Findings: Stable borderline cardiomegaly without evidence for vascular  congestion. Stable mediastinal silhouette with aortic atherosclerosis. Lungs  appear hyperinflated. There is right-sided volume loss with elevation of the  right hemidiaphragm. Peripheral right basilar streaky and patchy alveolar  opacity and reticular and hazy opacity at the left lung base without appreciable  change. Upper lung zones appear clear with anastomotic staples at the right  apex. No evidence for pleural effusion or pneumothorax. No acute osseous  abnormality identified. Impression IMPRESSION:    No significant interval change. Bibasal streaky and patchy opacity suspicious  for scarring and/or superimposed infiltrate, stable.          CT (Most Recent)   Results from Hospital Encounter encounter on 01/14/18   CTA CHEST W OR W WO CONT   Narrative CTA CHEST WITH IV CONTRAST    INDICATION: Dyspnea, elevated d-dimer, rule out pulmonary embolus    TECHNIQUE: Volumetric scanning with IV contrast. Thin overlapping coronal and  sagittal MIP reconstructions. One or more dose reduction techniques were used on  this CT: automated exposure control, adjustment of the mAs and/or kVp according  to patient's size, and iterative reconstruction techniques. The specific  techniques utilized on this CT exam have been documented in the patient's  electronic medical record. COMPARISON: Chest x-ray of the same day and chest CT 11/10/2010    FINDINGS:    General comment regarding exam quality:  Overall quality: Satisfactory. Adequacy of bolus: Satisfactory. Adequacy of suspended respiration: Satisfactory. Any other factors affecting exam quality: None. Heart and vasculature: Ascending abdominal aorta now measures about 4.7 x 4.6  cm. Previously it measured 4.3 cm At the arch, the caliber is about 3.8 cm. 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 This has calcifications peripherally and does  not contain IV contrast. Ulcerative plaque and calcifications of the descending  thoracic aorta which now shows caliber of 3.9 x 4.5 cm. Previously it measured  3.9 cm. Admixed contrast due to timing of the bolus is present at the descending  aorta. This area is not maximally evaluated. No periaortic hematoma or definite  dissection. No evidence of pulmonary embolus    Remaining mediastinum: Tracheomegaly. Subcarinal masslike focus measures up to  3.4 x 1.9 cm. Smaller paratracheal nodes. Lungs and pleural spaces: Postop changes at the right upper lobe from resection. Extensive centrilobular emphysema with bulla present. No pneumothorax. Fibrotic  banding and thickening of intralobular septae. At the right lower lobe there is  a soft tissue focus abutting the pleural surface which measures about 2.3 x 2.5  cm, inflammatory, scarring or infiltrate. Mass also is possible.  At the left  lower lobe confluent reticular densities, more numerous than before present with  some honeycombing as well as soft tissue components. Chest wall: No acute or aggressive bony abnormalities. Right lateral mid rib  defects, probably from the prior surgery. Visualized upper abdomen: Heterogeneous appearance of the gallbladder. There  could be sludge or gallstones. Consider ultrasound if the patient is symptomatic  in this area. Impression IMPRESSION:    No evidence of pulmonary embolus. 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 Consider vascular consultation. Extensive emphysematous changes with fibrous worsening fibrosis and  honeycombing. Pleural-based irregular densities at right lower lobe could be inflammatory or  infectious versus neoplasm. Suspect there is sludge or debris within the gallbladder. Subcarinal lymphadenopathy. I discussed the critical findings with Dr. Eliza Lugo at 3:00 AM.           EKG No results found for this or any previous visit. ECHO SUMMARY:  Left ventricle: Systolic function was moderately reduced. Ejection fraction   was estimated in the range of 35 % to 40 %. There was moderate diffuse hypokinesis. There was moderate asymmetric   hypertrophy of the basal wall. Doppler parameters  were consistent with abnormal left ventricular relaxation (grade 1 diastolic   dysfunction). Ventricular septum: There was paradoxical motion. Right ventricle: The size was normal. Systolic function was normal. Systolic   pressure was mildly to moderately  increased. Estimated peak pressure was 40 mmHg. PFT No flowsheet data found.      Other ASA reactivity:   Pre-albumin:   Ionized Calcium:   NH4:   T3, FT4:  Cortisol:  Urine Osm:  Urine Lytes:   HbA1c:          Imaging:  I have personally reviewed the patients radiographs and have reviewed the reports:     Best practice :  Fluids started at 30 ml/kg with aim to keep CVP 8 or above  Lactic acid ordered- initial and repeat Q6hrs if elevated till normalized. Cultures drawn. Antibiotic administered within 1hr-ICU  And 3hrs ED  Pressors aim MAP >65mmHg  Steriods  Glycemic control  Mech. Ventilated patients- aim to keep peak plateau pressure 06-88CP H2O. Sress ulcer prophylaxis  DVT prophylaxis    Vent bundle, Brower bundle and central line bundle followed. Leona Jarrett M.D.   Pulmonary Critical Care & Sleep Medicine

## 2018-01-18 NOTE — PROGRESS NOTES
Palliative Medicine Progress Note  DR. POLLACK'[facility]: [phone] ([phone])  LT[facility], located within [facility]: [address] [place] [address] Way: [phone]    Patient Name: Mira Gallegos  YOB: 1935    Date of Initial Consult: 1/16/18  Reason for Consult: care decisions  Requesting Provider: Dr. Soha Castellanos   Primary Care Physician: Nader Edge MD      SUMMARY:   Mira Gallegos is a 81y.o. year old with a past history of O2 dependent COPD admitted with acute exacerbation with respiratory failure requiring BIPAP. Tested + for influenza, on tamiflu. Slowly improving, used BIPAP last night. Has developed progressive weakness, wt loss and increasing SOB since having to move in with her daughter 10 months ago. Can't do much but go bed-chair. History of RUL lung cancer resected 2008, had pneumothorax and thorocostomy at that time. Daughter notes increase forgetfullness and some confusion over the last few months, worse since she she became acutely ill. Daughter works but up to now has been able to leave her home during the day. Independent in ADLs. Has an AMD at home, but did not bring it in.  CT chest demonstrates advanced emphesema and a RLL pleural based mass with hilar adenopathy. 1/17/18: Rested well, no pain, but SOB at rest. Doesn't remember telling them she didn't want BIPAP. 1/18/18: Up, eating breakfast, no complaints. Tolerated dose of opioid for dyspnea well. PALLIATIVE DIAGNOSES:     Patient Active Problem List   Diagnosis Code    Acute respiratory insufficiency R06.89    COPD exacerbation ([facility]) J44.1    Infiltrate noted on imaging study R93.8    Elevated brain natriuretic peptide (BNP) level R79.89    Fluid overload E87.70    Atrial fibrillation with RVR ([facility]) I48.91    History of lung cancer- RUL lobectomy  c 2007 Z85. 80    S/P lobectomy of lung Z90.2    Positive D dimer R79.89    SIRS (systemic inflammatory response syndrome) ([facility]) R65.10    PNA (pneumonia) J18.9    Hypoxia R09.02    Acquired hypothyroidism E03.9    HTN (hypertension) I10    Acute respiratory disease J06.9    CHF exacerbation (HCC) I50.9    Panlobular emphysema (HCC) J43.1    Right lower lobe lung mass- pleural based with hilar adenopathy R91.8     1. PLAN:   1. Met with patient and her daughter with review of current clinical condition. Described options to pursue diagnosis of probable lung cancer and potential treatment. She does not wish to undergo any sort of workup or consider treatment because of her already rapidly worsening quality of life. She prefers to focus on comfort and quality of life withhout additional hospitalizations. Recommended hospice enrollment and described services Her daughter will arrange additional care to allow her to be cared for at home with hospice. DDNR executed. Medications to manage dyspnea ordered. Would consider switching to oral abx and steroids to complete course and expedite home with hospice. Could take long acting diltiazem. Given goals of care long term anticoagulation not appropriate. Questions answered. 1/17/18: For d/c w/ hospice tomorrow. Agrees to trial of low dose oxycodone for dyspnea. 1/18/18: [facility] 2. Initial consult note routed to primary continuity provider  3.  Communicated plan of care with: Palliative IDT       GOALS OF CARE:     GOALS OF CARE:  Patient/Health Care Proxy Stated Goals: Comfort      TREATMENT PREFERENCES:   Code Status: DNR    Advance Care Planning:  Advance Care Planning 1/16/2018   Patient's Healthcare Decision Maker is: Verbal statement (Legal Next of Kin remains as decision maker)   Primary Decision Maker Name Karin Jones    Primary Decision Maker Phone Number [phone]   Primary Decision Maker Relationship to Patient Adult child   Confirm Advance Directive Yes, not on file   Does the patient have other document types Do Not Resuscitate       Medical Interventions: Comfort measures   Other Instructions:   Artificially Administered Nutrition: No feeding tube     Other:    As far as possible, the palliative care team has discussed with patient / health care proxy about goals of care / treatment preferences for patient. HISTORY:     History obtained from: patient    CHIEF COMPLAINT: see summary    HPI/SUBJECTIVE:    The patient is:   [x] Verbal and participatory  [] Non-participatory due to:        Clinical Pain Assessment (nonverbal scale for nonverbal patients): Clinical Pain Assessment  Severity: 1          Duration: for how long has pt been experiencing pain (e.g., 2 days, 1 month, years)  Frequency: how often pain is an issue (e.g., several times per day, once every few days, constant)     FUNCTIONAL ASSESSMENT:     Palliative Performance Scale (PPS):  PPS: 50       PSYCHOSOCIAL/SPIRITUAL SCREENING:     Advance Care Planning:  Advance Care Planning 1/16/2018   Patient's Healthcare Decision Maker is: Verbal statement (Legal Next of Kin remains as decision maker)   Primary Decision Maker Name Delaney George    Primary Decision Maker Phone Number [phone]   Primary Decision Maker Relationship to Patient Adult child   Confirm Advance Directive Yes, not on file   Does the patient have other document types Do Not Resuscitate        Any spiritual / Zoroastrianism concerns:  [] Yes /  [] No    Caregiver Burnout:  [] Yes /  [] No /  [] No Caregiver Present      Anticipatory grief assessment:   [] Normal  / [] Maladaptive       ESAS Anxiety: Anxiety: 0    ESAS Depression: Depression: 0        REVIEW OF SYSTEMS:     Positive and pertinent negative findings in ROS are noted above in HPI. The following systems were [x] reviewed / [] unable to be reviewed as noted in HPI  Other findings are noted below. Systems: constitutional, ears/nose/mouth/throat, respiratory, gastrointestinal, genitourinary, musculoskeletal, integumentary, neurologic, psychiatric, endocrine.  Positive findings noted below. Modified ESAS Completed by: provider   Fatigue: 7 Drowsiness: 0   Depression: 0 Pain: 1   Anxiety: 0 Nausea: 0   Anorexia: 6 Dyspnea: 7     Constipation: Yes     Stool Occurrence(s): 1        PHYSICAL EXAM:     Wt Readings from Last 3 Encounters:   01/18/18 54.4 kg (120 lb)   01/12/16 52.2 kg (115 lb)     Blood pressure 146/64, pulse 76, temperature 97.2 °F (36.2 °C), resp. rate 20, height 5' 5\" (1.651 m), weight 54.4 kg (120 lb), SpO2 99 %, not currently breastfeeding.   Pain:  Pain Scale 1: Numeric (0 - 10)  Pain Intensity 1: 0  Pain Onset 1: acute  Pain Location 1: Head  Pain Orientation 1: Anterior  Pain Description 1: Constant, Aching  Pain Intervention(s) 1: Medication (see MAR)  Last bowel movement:     Constitutional: frail, ill appearing  Eyes: pupils equal, anicteric  ENMT: no nasal discharge, moist mucous membranes  Cardiovascular: regular rhythm, distal pulses intact  Respiratory: breathing not labored, symmetric  Gastrointestinal: soft non-tender, +bowel sounds  Musculoskeletal: no deformity, no tenderness to palpation  Skin: warm, dry  Neurologic: following commands, moving all extremities  Psychiatric: full affect, no hallucinations  Other:       HISTORY:     Principal Problem:    Acute respiratory insufficiency (1/15/2018)    Active Problems:    COPD exacerbation ([address] (1/15/2018)      Infiltrate noted on imaging study (1/15/2018)      Elevated brain natriuretic peptide (BNP) level (1/15/2018)      Fluid overload (1/15/2018)      Atrial fibrillation with RVR ([address] (1/15/2018)      History of lung cancer- RUL lobectomy  c 2007 (1/15/2018)      S/P lobectomy of lung (1/15/2018)      Positive D dimer (1/15/2018)      SIRS (systemic inflammatory response syndrome) (HCC) (1/15/2018)      PNA (pneumonia) (1/15/2018)      Hypoxia (1/15/2018)      Acquired hypothyroidism (1/15/2018)      HTN (hypertension) (1/15/2018)      Acute respiratory disease (1/15/2018)      CHF exacerbation ([facility] (1/15/2018)      Panlobular emphysema ([facility] (1/16/2018)      Right lower lobe lung mass- pleural based with hilar adenopathy (1/16/2018)      Past Medical History:   Diagnosis Date    Chronic obstructive pulmonary disease ([facility]     History of lung cancer- RUL lobectomy  c 2007 1/15/2018    Hypertension     Lung cancer ([facility]     Panlobular emphysema ([facility] 1/16/2018    Right lower lobe lung mass- pleural based with hilar adenopathy 1/16/2018      Past Surgical History:   Procedure Laterality Date    HX HYSTERECTOMY      HX OTHER SURGICAL      right lung lobe removal      History reviewed. No pertinent family history. History reviewed, no pertinent family history.   Social History   Substance Use Topics    Smoking status: Former Smoker    Smokeless tobacco: Not on file    Alcohol use No     Allergies   Allergen Reactions    Avelox [Moxifloxacin] Rash and Swelling      Current Facility-Administered Medications   Medication Dose Route Frequency    predniSONE (DELTASONE) tablet 40 mg  40 mg Oral DAILY WITH BREAKFAST    amoxicillin-clavulanate (AUGMENTIN) 875-125 mg per tablet 1 Tab  1 Tab Oral Q12H    azithromycin (ZITHROMAX) tablet 500 mg  500 mg Oral DAILY    furosemide (LASIX) tablet 20 mg  20 mg Oral DAILY    oxyCODONE (ROXICODONE INTENSOL) 20 mg/mL concentrated solution 5 mg  5 mg Oral Q3H PRN    ondansetron hcl (ZOFRAN) tablet 4 mg  4 mg Oral Q6H PRN    PARoxetine (PAXIL) tablet 20 mg  20 mg Oral DAILY    methIMAzole (TAPAZOLE) tablet 2.5 mg  2.5 mg Oral DAILY    temazepam (RESTORIL) capsule 30 mg  30 mg Oral QHS PRN    guaiFENesin ER (MUCINEX) tablet 600 mg  600 mg Oral Q12H    budesonide (PULMICORT) 500 mcg/2 ml nebulizer suspension  500 mcg Nebulization BID RT    ipratropium (ATROVENT) 0.02 % nebulizer solution 0.5 mg  0.5 mg Nebulization Q4H RT    insulin lispro (HUMALOG) injection   SubCUTAneous AC&HS    glucose chewable tablet 16 g  4 Tab Oral PRN    glucagon (GLUCAGEN) injection 1 mg  1 mg IntraMUSCular PRN    dextrose (D50W) injection syrg 12.5-25 g  25-50 mL IntraVENous PRN    dilTIAZem (CARDIZEM) IR tablet 30 mg  30 mg Oral TIDAC    enoxaparin (LOVENOX) injection 50 mg  1 mg/kg SubCUTAneous Q12H    acetaminophen (TYLENOL) tablet 650 mg  650 mg Oral Q6H PRN    oseltamivir (TAMIFLU) capsule 30 mg  30 mg Oral Q12H    nystatin (MYCOSTATIN) 100,000 unit/mL oral suspension 500,000 Units  500,000 Units Oral QID        LAB AND IMAGING FINDINGS:     Lab Results   Component Value Date/Time    WBC 9.6 01/16/2018 05:46 AM    HGB 11.1 01/16/2018 05:46 AM    PLATELET 240 59/28/5048 04:50 AM     Lab Results   Component Value Date/Time    Sodium 142 01/16/2018 05:46 AM    Potassium 4.0 01/16/2018 05:46 AM    Chloride 104 01/16/2018 05:46 AM    CO2 36 01/16/2018 05:46 AM    BUN 34 01/16/2018 05:46 AM    Creatinine 0.72 01/17/2018 04:50 AM    Calcium 8.7 01/16/2018 05:46 AM    Magnesium 2.1 01/16/2018 05:46 AM      Lab Results   Component Value Date/Time    AST (SGOT) 59 01/16/2018 05:46 AM    Alk.  phosphatase 80 01/16/2018 05:46 AM    Protein, total 6.8 01/16/2018 05:46 AM    Albumin 2.7 01/16/2018 05:46 AM    Globulin 4.1 01/16/2018 05:46 AM     Lab Results   Component Value Date/Time    INR 1.2 01/15/2018 09:30 AM    Prothrombin time 14.9 01/15/2018 09:30 AM    aPTT 36.2 01/15/2018 09:30 AM      No results found for: IRON, FE, TIBC, IBCT, PSAT, FERR   No results found for: PH, PCO2, PO2  No components found for: [place]   Lab Results   Component Value Date/Time     01/15/2018 09:30 AM    CK - MB 4.6 01/15/2018 09:30 AM              Total time: 25 min  Counseling / coordination time, spent as noted above: 18  > 50% counseling / coordination        Ruthann Peterson MD  Palliative Medicine

## 2018-01-18 NOTE — PROGRESS NOTES
46 - Dr. Aj Allen at bedside. Plan is to dc pt home with hospice today. Pt refused RT tx this morning, then refused all oral meds. After additional teaching, pt was receptive to oral regimen and ingested all AM rx.    1300 - Left message for daughter, Mariama Adamson, to advise of new transport time (1630hrs today). 1330 - I have reviewed discharge instructions and medications with the patient. The patient verbalized understanding. 18 - Established contact with daughter. Daughter aware of new transport time and plans to bring warm clothing to [facility] for pt to change into beforehand. 1510 - pt c/o nausea that was responsive to zofran    Summary: Pt comfortable during shift, good appetite, asks questions and is involved in POC, denies complaints.       Lab Results   Component Value Date/Time    Glucose 147 01/16/2018 05:46 AM    Glucose (POC) 163 01/18/2018 12:33 PM    Glucose,  01/14/2018 11:57 PM     Patient Vitals for the past 12 hrs:   Temp Pulse Resp BP SpO2   01/18/18 1459 98.6 °F (37 °C) 85 18 149/79 98 %   01/18/18 1234 98.4 °F (36.9 °C) 96 18 140/66 94 %   01/18/18 1154 - - - - 92 %   01/18/18 0917 97.2 °F (36.2 °C) 76 20 146/64 99 %   01/18/18 0427 97.2 °F (36.2 °C) 79 22 144/81 98 %

## 2018-01-18 NOTE — PROGRESS NOTES
Shift Summary :  Slept well. Pure wick in place for urinary incontinence. No cough noted but experienced some admitted shortness of breath at rest and with minimal exertion. Oxygen continues at 4 L /min with sats at 92 - 93 %. Complained of headache that was corrected with tylenol. No other pain or nausea expressed.

## 2018-01-18 NOTE — DISCHARGE INSTRUCTIONS
DISCHARGE SUMMARY from Nurse    PATIENT INSTRUCTIONS:    Recommended activity: Activity as tolerated. *  Please give a list of your current medications to your Primary Care Provider. *  Please update this list whenever your medications are discontinued, doses are      changed, or new medications (including over-the-counter products) are added. *  Please carry medication information at all times in case of emergency situations. These are general instructions for a healthy lifestyle:    No smoking/ No tobacco products/ Avoid exposure to second hand smoke  Surgeon General's Warning:  Quitting smoking now greatly reduces serious risk to your health. Obesity, smoking, and sedentary lifestyle greatly increases your risk for illness    A healthy diet, regular physical exercise & weight monitoring are important for maintaining a healthy lifestyle    You may be retaining fluid if you have a history of heart failure or if you experience any of the following symptoms:  Weight gain of 3 pounds or more overnight or 5 pounds in a week, increased swelling in our hands or feet or shortness of breath while lying flat in bed. Please call your doctor as soon as you notice any of these symptoms; do not wait until your next office visit. Recognize signs and symptoms of STROKE:    F-face looks uneven    A-arms unable to move or move unevenly    S-speech slurred or non-existent    T-time-call 911 as soon as signs and symptoms begin-DO NOT go       Back to bed or wait to see if you get better-TIME IS BRAIN. Warning Signs of HEART ATTACK     Call 911 if you have these symptoms:   Chest discomfort. Most heart attacks involve discomfort in the center of the chest that lasts more than a few minutes, or that goes away and comes back. It can feel like uncomfortable pressure, squeezing, fullness, or pain.  Discomfort in other areas of the upper body.  Symptoms can include pain or discomfort in one or both arms, the back, neck, jaw, or stomach.  Shortness of breath with or without chest discomfort.  Other signs may include breaking out in a cold sweat, nausea, or lightheadedness. Don't wait more than five minutes to call 911 - MINUTES MATTER! Fast action can save your life. Calling 911 is almost always the fastest way to get lifesaving treatment. Emergency Medical Services staff can begin treatment when they arrive -- up to an hour sooner than if someone gets to the hospital by car. The discharge information has been reviewed with the patient. The patient verbalized understanding. Discharge medications reviewed with the patient and appropriate educational materials and side effects teaching were provided. ___________________________________________________________________________________________________________________________________     Chronic Obstructive Pulmonary Disease (COPD): Care Instructions  Your Care Instructions    Chronic obstructive pulmonary disease (COPD) is a general term for a group of lung diseases, including emphysema and chronic bronchitis. People with COPD have decreased airflow in and out of the lungs, which makes it hard to breathe. The airways also can get clogged with thick mucus. Cigarette smoking is a major cause of COPD. Although there is no cure for COPD, you can slow its progress. Following your treatment plan and taking care of yourself can help you feel better and live longer. Follow-up care is a key part of your treatment and safety. Be sure to make and go to all appointments, and call your doctor if you are having problems. It's also a good idea to know your test results and keep a list of the medicines you take. How can you care for yourself at home? ?Staying healthy  ? · Do not smoke. This is the most important step you can take to prevent more damage to your lungs. If you need help quitting, talk to your doctor about stop-smoking programs and medicines.  These can increase your chances of quitting for good. ? · Avoid colds and flu. Get a pneumococcal vaccine shot. If you have had one before, ask your doctor whether you need a second dose. Get the flu vaccine every fall. If you must be around people with colds or the flu, wash your hands often. ? · Avoid secondhand smoke, air pollution, and high altitudes. Also avoid cold, dry air and hot, humid air. Stay at home with your windows closed when air pollution is bad. ?Medicines and oxygen therapy  ? · Take your medicines exactly as prescribed. Call your doctor if you think you are having a problem with your medicine. ? · You may be taking medicines such as:  ¨ Bronchodilators. These help open your airways and make breathing easier. Bronchodilators are either short-acting (work for 6 to 9 hours) or long-acting (work for 24 hours). You inhale most bronchodilators, so they start to act quickly. Always carry your quick-relief inhaler with you in case you need it while you are away from home. ¨ Corticosteroids (prednisone, budesonide). These reduce airway inflammation. They come in pill or inhaled form. You must take these medicines every day for them to work well. ? · A spacer may help you get more inhaled medicine to your lungs. Ask your doctor or pharmacist if a spacer is right for you. If it is, ask how to use it properly. ? · Do not take any vitamins, over-the-counter medicine, or herbal products without talking to your doctor first.   ? · If your doctor prescribed antibiotics, take them as directed. Do not stop taking them just because you feel better. You need to take the full course of antibiotics. ? · Oxygen therapy boosts the amount of oxygen in your blood and helps you breathe easier. Use the flow rate your doctor has recommended, and do not change it without talking to your doctor first.   Activity  ? · Get regular exercise. Walking is an easy way to get exercise. Start out slowly, and walk a little more each day. ? · Pay attention to your breathing. You are exercising too hard if you cannot talk while you are exercising. ? · Take short rest breaks when doing household chores and other activities. ? · Learn breathing methods-such as breathing through pursed lips-to help you become less short of breath. ? · If your doctor has not set you up with a pulmonary rehabilitation program, talk to him or her about whether rehab is right for you. Rehab includes exercise programs, education about your disease and how to manage it, help with diet and other changes, and emotional support. Diet  ? · Eat regular, healthy meals. Use bronchodilators about 1 hour before you eat to make it easier to eat. Eat several small meals instead of three large ones. Drink beverages at the end of the meal. Avoid foods that are hard to chew. ? · Eat foods that contain protein so that you do not lose muscle mass. ? · Talk with your doctor if you gain too much weight or if you lose weight without trying. ?Mental health  ? · Talk to your family, friends, or a therapist about your feelings. It is normal to feel frightened, angry, hopeless, helpless, and even guilty. Talking openly about bad feelings can help you cope. If these feelings last, talk to your doctor. When should you call for help? Call 911 anytime you think you may need emergency care. For example, call if:  ? · You have severe trouble breathing. ?Call your doctor now or seek immediate medical care if:  ? · You have new or worse trouble breathing. ? · You cough up blood. ? · You have a fever. ? Watch closely for changes in your health, and be sure to contact your doctor if:  ? · You cough more deeply or more often, especially if you notice more mucus or a change in the color of your mucus. ? · You have new or worse swelling in your legs or belly. ? · You are not getting better as expected. Where can you learn more?   Go to http://parisa.info/. Michell Gallo in the search box to learn more about \"Chronic Obstructive Pulmonary Disease (COPD): Care Instructions. \"  Current as of: May 12, 2017  Content Version: 11.4  © 9044-9070 [employer]wise, [employer]. Care instructions adapted under license by [employer] (which disclaims liability or warranty for this information). If you have questions about a medical condition or this instruction, always ask your healthcare professional. [address] any warranty or liability for your use of this information.

## 2018-01-18 NOTE — PROGRESS NOTES
Problem: Falls - Risk of  Goal: *Absence of Falls  Document Marsha Fall Risk and appropriate interventions in the flowsheet.    Outcome: Progressing Towards Goal  Fall Risk Interventions:  Mobility Interventions: Assess mobility with egress test, Communicate number of staff needed for ambulation/transfer, Patient to call before getting OOB, Utilize walker, cane, or other assitive device         Medication Interventions: Patient to call before getting OOB, Teach patient to arise slowly, Evaluate medications/consider consulting pharmacy    Elimination Interventions: Call light in reach, Patient to call for help with toileting needs, Toileting schedule/hourly rounds

## 2018-01-18 NOTE — PROGRESS NOTES
D/C Plan:  [facility] 1/18/18    Noted pt transfer to [facility]. Pt is to be discharged today with [facility]. Pt was on home O2 prior to admission. Medical transport  ([facility], [address] [place]) has been arranged this afternoon. Pt's daughter and hospice agency are aware of plan discharge today. No other plan of care needs have been identified. Care Management Interventions  PCP Verified by CM:  Yes  Mode of Transport at Discharge: BLS  Transition of Care Consult (CM Consult): [place]: No  Reason Outside [place]: Patient already serviced by other home care/hospice agency (Pt/family choice)  Health Maintenance Reviewed: Yes  Physical Therapy Consult: Yes  Occupational Therapy Consult: Yes  Current Support Network: Relative's Home  Confirm Follow Up Transport: Family  Plan discussed with Pt/Family/Caregiver: Yes  Freedom of Choice Offered: Yes  Discharge Location  Discharge Placement: Home with hospice ([facility])

## 2018-01-18 NOTE — ROUTINE PROCESS
Bedside and Verbal shift change report given to HARVINDER King RN  (oncoming nurse) by  CHIVO Tapia RN  (offgoing nurse). Report included the following information SBAR, Kardex, Recent Results and Med Rec Status.

## 2018-01-18 NOTE — ROUTINE PROCESS
Bedside shift change report given to Fly Villalta RN (oncoming nurse) by Ruthellen Carrel, RN (offgoing nurse). Report included the following information SBAR, Kardex, ED Summary, Intake/Output, MAR and Recent Results.

## 2018-01-18 NOTE — PROGRESS NOTES
Pt. Here  Approx. 1600. to room and call lt. Dinner ordered. Pt. incont. Of urine,pt. States has no control now. Cleaned up and periwick applied. Pt. States more comfortable now.

## 2018-01-18 NOTE — DISCHARGE SUMMARY
Janet Pulido  MR#: [id number]  : 1935  ACCOUNT #: [de-identified]   ADMIT DATE: 2018  DISCHARGE DATE:     DIAGNOSES AT DISCHARGE:  Include the followin. Acute respiratory failure due to chronic obstructive pulmonary disease exacerbation. 2.  Influenza A.  3.  Pneumonia. 4.  Atrial fibrillation with RVR. 5.  Thoracic aortic aneurysm. 6.  Advanced chronic obstructive pulmonary disease. 7.  Acute on chronic systolic heart failure. 8.  Underweight status. 9.  Hyperthyroidism. 10.  History of lung cancer, status post lobectomy. 11.  Hypertension. CONSULTANTS:    1.  Dr. Matilda Bsuby, Pulmonology. 2.  Dr. Kerrie Jones, Palliative Care. 3.  Dr. Bonny Chan, Cardiology. The patient is being discharged today on home hospice. She is going to require oxygen and the following medications have been prescribed for her for discharge:  Augmentin 875 one p.o. twice daily for 7 days, Zithromax 500 mg daily for 5 days, Pulmicort 2 mL by nebulizers twice a day, Cardizem  mg daily, Lasix 20 mg daily, Atrovent 2.5 mL by nebs every 4 hours as needed, Tapazole 5 mg daily, Nystatin suspension 5 mL by mouth 4 times daily swish and spit, Tamiflu 30 mg twice daily until tomorrow afternoon, oxycodone solution 0.25 mL every 3 hours as needed for pain or shortness of breath, prednisone taper from 40 mg to 10 mg over the next 11 days, and Restoril 30 mg at night as needed for sleep. The patient has a do not resuscitate and has opted for home hospice, which is being arranged at this point. HOSPITAL SUMMARY:  The patient was admitted on 01/15/2018 to the hospital.  She came into the emergency room with complaints of shortness of breath. She has been feeling poorly for the past 5 days dealing with upper respiratory infection. She had been seen at an urgent care facility and was told she may have bronchitis. She was given prednisone and an antibiotic.   However, she continued to get worse with her shortness of breath. She had a low grade temperature at home and she came into the emergency room where she was placed on BiPAP for respiratory distress. She had atrial fibrillation with RVR. Cardiology was consulted. She was tested positive for flu. She was put on antibiotics, given fluids and suction, admitted to the intensive care unit. Patient's initial labs showed an elevated white blood cell count at 14.9. Her hemoglobin was normal.  Electrolytes were fairly unremarkable. Creatinine was 1.07. Her lactic acid was elevated at 2.2 on the 15th. I do not see that it was repeated. A hemoglobin A1c was 5. Her troponin was 0.9. Blood cultures were drawn; thus far those have been no growth. A CT angiogram of her chest was completed; that showed no evidence for PE, but she did have a thoracic aortic aneurysm which was enlarging with extensive emphysematous changes of the lungs, pleural based irregular densities at the right lower lobe, which could be infectious versus neoplasm and she had subcarinal lymphadenopathy. Chest x-ray on the 16th for followup showed patchy opacity suspicious for scarring and/or superimposed infiltrate. An echocardiogram was completed on 01/15/2018 that showed an EF of 35% to 40%, so she was seen in the intensive care unit by Pulmonology as well as Cardiology and Cardiology noted that she would be a candidate for anticoagulation for her atrial fibrillation and to monitor her heart failure symptomatology. Pulmonology saw her also and recommended that her main issue may be the COPD, but there was concern about malignancy obviously and that right lower lobe pleural abnormality, so ultimately she was stabilized enough to come out to the medical floor. She was seen in consultation by Palliative Care and family meetings were held. Family got together and made agreements that they wanted her to transition to hospice care.   She is actually still awake and alert. She can take oral medications. She did eat a little bit this morning, but the family continues to support hospice care for her considering her decline physically as well as cognitively over the last few months. Her daughter has had to stop working to be able to care for her during the day at home and so at this point in time, instructions to me are to proceed with the hospice plan. They have been setting this up for equipment delivery, which I suspect will include oxygen and whatever other home devices she may need. She appears to be in no respiratory distress this morning. She is awake. She is communicative. She appears with advanced debility and weakness as an elderly white female who is on nasal cannula O2, but does not complain of any pain, chest pain, shortness of breath, productive cough, or wheezing. She has no nausea, vomiting, or abdominal pain. So with that said, her exam shows as noted above, a debilitated elderly white female whose blood pressure is currently 146/64, pulse is 76, temperature 97.2, respiratory rate is 20, and she is saturating 99% on 4 liters. She is on all oral medications at this point in time, which can continue for her home transition. She has nebs ordered routinely also; we will make sure she has got a nebulizer and our plan is to discharge her home on home hospice with the meds as noted above with O2 and pain medication as necessary, the hospice protocol and whatever other supplies she will need for that transition. [facility] is the company. We plan her discharge this afternoon. TIME:  45 minutes on discharge time. MD JAMEEL Neville/[place]  D: 01/18/2018 10:29     T: 01/18/2018 12:36  JOB #: [id number]  CC: Elease Eisenmenger MD  [facility] [id number] [facility]